# Patient Record
Sex: FEMALE | Race: WHITE
[De-identification: names, ages, dates, MRNs, and addresses within clinical notes are randomized per-mention and may not be internally consistent; named-entity substitution may affect disease eponyms.]

---

## 2017-11-27 ENCOUNTER — HOSPITAL ENCOUNTER (EMERGENCY)
Dept: HOSPITAL 54 - ER | Age: 67
Discharge: HOME | End: 2017-11-27
Payer: COMMERCIAL

## 2017-11-27 VITALS — HEIGHT: 63 IN | WEIGHT: 130 LBS | BODY MASS INDEX: 23.04 KG/M2

## 2017-11-27 VITALS — SYSTOLIC BLOOD PRESSURE: 156 MMHG | DIASTOLIC BLOOD PRESSURE: 82 MMHG

## 2017-11-27 DIAGNOSIS — N39.0: Primary | ICD-10-CM

## 2017-11-27 DIAGNOSIS — E11.9: ICD-10-CM

## 2017-11-27 DIAGNOSIS — Z95.818: ICD-10-CM

## 2017-11-27 DIAGNOSIS — F41.9: ICD-10-CM

## 2017-11-27 DIAGNOSIS — R11.2: ICD-10-CM

## 2017-11-27 DIAGNOSIS — R79.1: ICD-10-CM

## 2017-11-27 LAB
ALBUMIN SERPL BCP-MCNC: 3.5 G/DL (ref 3.4–5)
ALP SERPL-CCNC: 99 U/L (ref 46–116)
ALT SERPL W P-5'-P-CCNC: 15 U/L (ref 12–78)
APPEARANCE UR: (no result)
APTT PPP: 29 SEC (ref 23–34)
AST SERPL W P-5'-P-CCNC: 14 U/L (ref 15–37)
BASOPHILS # BLD AUTO: 0 /CMM (ref 0–0.2)
BASOPHILS NFR BLD AUTO: 0.1 % (ref 0–2)
BILIRUB DIRECT SERPL-MCNC: 0.1 MG/DL (ref 0–0.2)
BILIRUB SERPL-MCNC: 0.4 MG/DL (ref 0.2–1)
BILIRUB UR QL STRIP: NEGATIVE
BUN SERPL-MCNC: 25 MG/DL (ref 7–18)
CALCIUM SERPL-MCNC: 10.3 MG/DL (ref 8.5–10.1)
CHLORIDE SERPL-SCNC: 96 MMOL/L (ref 98–107)
CO2 SERPL-SCNC: 27 MMOL/L (ref 21–32)
COLOR UR: YELLOW
CREAT SERPL-MCNC: 1.1 MG/DL (ref 0.6–1.3)
DEPRECATED SQUAMOUS URNS QL MICRO: (no result) /HPF
EOSINOPHIL # BLD AUTO: 0 /CMM (ref 0–0.7)
EOSINOPHIL NFR BLD AUTO: 0.2 % (ref 0–6)
GLUCOSE SERPL-MCNC: 334 MG/DL (ref 74–106)
GLUCOSE UR STRIP-MCNC: (no result) MG/DL
HCT VFR BLD AUTO: 31 % (ref 33–45)
HGB BLD-MCNC: 10.4 G/DL (ref 11.5–14.8)
HGB UR QL STRIP: (no result) ERY/UL
INR PPP: 1.07 (ref 0.87–1.13)
KETONES UR STRIP-MCNC: (no result) MG/DL
LEUKOCYTE ESTERASE UR QL STRIP: (no result)
LIPASE SERPL-CCNC: 75 U/L (ref 73–393)
LYMPHOCYTES NFR BLD AUTO: 0.5 /CMM (ref 0.8–4.8)
LYMPHOCYTES NFR BLD AUTO: 4.7 % (ref 20–44)
MCH RBC QN AUTO: 30 PG (ref 26–33)
MCHC RBC AUTO-ENTMCNC: 33 G/DL (ref 31–36)
MCV RBC AUTO: 91 FL (ref 82–100)
MONOCYTES NFR BLD AUTO: 0.2 /CMM (ref 0.1–1.3)
MONOCYTES NFR BLD AUTO: 2.3 % (ref 2–12)
NEUTROPHILS # BLD AUTO: 9.6 /CMM (ref 1.8–8.9)
NEUTROPHILS NFR BLD AUTO: 92.7 % (ref 43–81)
NITRITE UR QL STRIP: POSITIVE
PH UR STRIP: 6 [PH] (ref 5–8)
PLATELET # BLD AUTO: 450 /CMM (ref 150–450)
POTASSIUM SERPL-SCNC: 3.3 MMOL/L (ref 3.5–5.1)
PROT SERPL-MCNC: 8.4 G/DL (ref 6.4–8.2)
PROT UR QL STRIP: (no result) MG/DL
PROTHROMBIN TIME: 11.1 SECS (ref 9.5–12.7)
RBC # BLD AUTO: 3.46 MIL/UL (ref 4–5.2)
RBC #/AREA URNS HPF: (no result) /HPF (ref 0–2)
RDW COEFFICIENT OF VARIATION: 13 (ref 11.5–15)
SODIUM SERPL-SCNC: 139 MMOL/L (ref 136–145)
TROPONIN I SERPL-MCNC: < 0.017 NG/ML (ref 0–0.06)
UROBILINOGEN UR STRIP-MCNC: 0.2 EU/DL
WBC #/AREA URNS HPF: (no result) /HPF
WBC #/AREA URNS HPF: (no result) /HPF (ref 0–3)
WBC NRBC COR # BLD AUTO: 10.3 K/UL (ref 4.3–11)

## 2017-11-27 PROCEDURE — 87086 URINE CULTURE/COLONY COUNT: CPT

## 2017-11-27 PROCEDURE — 96374 THER/PROPH/DIAG INJ IV PUSH: CPT

## 2017-11-27 PROCEDURE — Z7610: HCPCS

## 2017-11-27 PROCEDURE — 80076 HEPATIC FUNCTION PANEL: CPT

## 2017-11-27 PROCEDURE — 87186 SC STD MICRODIL/AGAR DIL: CPT

## 2017-11-27 PROCEDURE — 93005 ELECTROCARDIOGRAM TRACING: CPT

## 2017-11-27 PROCEDURE — 80048 BASIC METABOLIC PNL TOTAL CA: CPT

## 2017-11-27 PROCEDURE — 85025 COMPLETE CBC W/AUTO DIFF WBC: CPT

## 2017-11-27 PROCEDURE — 71010: CPT

## 2017-11-27 PROCEDURE — 99285 EMERGENCY DEPT VISIT HI MDM: CPT

## 2017-11-27 PROCEDURE — 96375 TX/PRO/DX INJ NEW DRUG ADDON: CPT

## 2017-11-27 PROCEDURE — 36415 COLL VENOUS BLD VENIPUNCTURE: CPT

## 2017-11-27 PROCEDURE — 84484 ASSAY OF TROPONIN QUANT: CPT

## 2017-11-27 PROCEDURE — 96376 TX/PRO/DX INJ SAME DRUG ADON: CPT

## 2017-11-27 PROCEDURE — A4606 OXYGEN PROBE USED W OXIMETER: HCPCS

## 2017-11-27 PROCEDURE — 96361 HYDRATE IV INFUSION ADD-ON: CPT

## 2017-11-27 PROCEDURE — C9113 INJ PANTOPRAZOLE SODIUM, VIA: HCPCS

## 2017-11-27 PROCEDURE — 83690 ASSAY OF LIPASE: CPT

## 2017-11-27 PROCEDURE — 81001 URINALYSIS AUTO W/SCOPE: CPT

## 2017-11-27 PROCEDURE — 85730 THROMBOPLASTIN TIME PARTIAL: CPT

## 2017-11-27 NOTE — NUR
BIB RA C/O ABD PAIN, N/V, STATING SHE'S HAD A UTI FOR A FEW DAYS NOW.  PATIENT 
IS A/OX 4. BREATHING EVEN AND UNLABORED. NO SOB. VITALS STABLE.  SAFETY AND 
COMFORT MEASURES IN PLACE. AWAITING MD ORDERS .

## 2018-09-16 ENCOUNTER — HOSPITAL ENCOUNTER (INPATIENT)
Dept: HOSPITAL 54 - ER | Age: 68
LOS: 1 days | Discharge: ANOTHER HOSPITAL | DRG: 242 | End: 2018-09-17
Attending: FAMILY MEDICINE | Admitting: FAMILY MEDICINE
Payer: COMMERCIAL

## 2018-09-16 VITALS — DIASTOLIC BLOOD PRESSURE: 61 MMHG | SYSTOLIC BLOOD PRESSURE: 108 MMHG

## 2018-09-16 VITALS — WEIGHT: 160 LBS | HEIGHT: 64 IN | BODY MASS INDEX: 27.31 KG/M2

## 2018-09-16 VITALS — DIASTOLIC BLOOD PRESSURE: 49 MMHG | SYSTOLIC BLOOD PRESSURE: 104 MMHG

## 2018-09-16 VITALS — DIASTOLIC BLOOD PRESSURE: 78 MMHG | SYSTOLIC BLOOD PRESSURE: 104 MMHG

## 2018-09-16 DIAGNOSIS — Z79.899: ICD-10-CM

## 2018-09-16 DIAGNOSIS — E87.1: ICD-10-CM

## 2018-09-16 DIAGNOSIS — N18.4: ICD-10-CM

## 2018-09-16 DIAGNOSIS — F41.9: ICD-10-CM

## 2018-09-16 DIAGNOSIS — I25.5: ICD-10-CM

## 2018-09-16 DIAGNOSIS — D72.829: ICD-10-CM

## 2018-09-16 DIAGNOSIS — Z79.82: ICD-10-CM

## 2018-09-16 DIAGNOSIS — R00.1: ICD-10-CM

## 2018-09-16 DIAGNOSIS — E86.1: ICD-10-CM

## 2018-09-16 DIAGNOSIS — D63.8: ICD-10-CM

## 2018-09-16 DIAGNOSIS — Z79.4: ICD-10-CM

## 2018-09-16 DIAGNOSIS — R57.0: ICD-10-CM

## 2018-09-16 DIAGNOSIS — N17.0: ICD-10-CM

## 2018-09-16 DIAGNOSIS — E44.1: ICD-10-CM

## 2018-09-16 DIAGNOSIS — I25.10: ICD-10-CM

## 2018-09-16 DIAGNOSIS — Z95.5: ICD-10-CM

## 2018-09-16 DIAGNOSIS — J96.00: ICD-10-CM

## 2018-09-16 DIAGNOSIS — I21.4: Primary | ICD-10-CM

## 2018-09-16 DIAGNOSIS — Z79.84: ICD-10-CM

## 2018-09-16 DIAGNOSIS — E11.22: ICD-10-CM

## 2018-09-16 DIAGNOSIS — I25.2: ICD-10-CM

## 2018-09-16 DIAGNOSIS — E03.9: ICD-10-CM

## 2018-09-16 DIAGNOSIS — I44.2: ICD-10-CM

## 2018-09-16 DIAGNOSIS — E78.5: ICD-10-CM

## 2018-09-16 LAB
APTT PPP: 28 SEC (ref 23–34)
BASOPHILS # BLD AUTO: 0.1 /CMM (ref 0–0.2)
BASOPHILS NFR BLD AUTO: 1 % (ref 0–2)
BUN SERPL-MCNC: 34 MG/DL (ref 7–18)
CALCIUM SERPL-MCNC: 8.2 MG/DL (ref 8.5–10.1)
CHLORIDE SERPL-SCNC: 101 MMOL/L (ref 98–107)
CO2 SERPL-SCNC: 22 MMOL/L (ref 21–32)
CREAT SERPL-MCNC: 1.9 MG/DL (ref 0.6–1.3)
EOSINOPHIL NFR BLD AUTO: 1.2 % (ref 0–6)
GLUCOSE SERPL-MCNC: 263 MG/DL (ref 74–106)
HCT VFR BLD AUTO: 28 % (ref 33–45)
HGB BLD-MCNC: 9.3 G/DL (ref 11.5–14.8)
INR PPP: 1.06 (ref 0.85–1.15)
LYMPHOCYTES NFR BLD AUTO: 1.3 /CMM (ref 0.8–4.8)
LYMPHOCYTES NFR BLD AUTO: 9.6 % (ref 20–44)
MCH RBC QN AUTO: 30 PG (ref 26–33)
MCHC RBC AUTO-ENTMCNC: 33 G/DL (ref 31–36)
MCV RBC AUTO: 89 FL (ref 82–100)
MONOCYTES NFR BLD AUTO: 0.4 /CMM (ref 0.1–1.3)
MONOCYTES NFR BLD AUTO: 3.2 % (ref 2–12)
NEUTROPHILS # BLD AUTO: 11.8 /CMM (ref 1.8–8.9)
NEUTROPHILS NFR BLD AUTO: 85 % (ref 43–81)
PLATELET # BLD AUTO: 322 /CMM (ref 150–450)
POTASSIUM SERPL-SCNC: 4.5 MMOL/L (ref 3.5–5.1)
RBC # BLD AUTO: 3.16 MIL/UL (ref 4–5.2)
RDW COEFFICIENT OF VARIATION: 14.5 (ref 11.5–15)
SODIUM SERPL-SCNC: 132 MMOL/L (ref 136–145)
TROPONIN I SERPL-MCNC: 2.82 NG/ML (ref 0–0.06)
WBC NRBC COR # BLD AUTO: 13.8 K/UL (ref 4.3–11)

## 2018-09-16 PROCEDURE — C1751 CATH, INF, PER/CENT/MIDLINE: HCPCS

## 2018-09-16 PROCEDURE — A4606 OXYGEN PROBE USED W OXIMETER: HCPCS

## 2018-09-16 PROCEDURE — Z7610: HCPCS

## 2018-09-16 PROCEDURE — C9113 INJ PANTOPRAZOLE SODIUM, VIA: HCPCS

## 2018-09-16 RX ADMIN — INSULIN GLARGINE SCH UNIT: 100 INJECTION, SOLUTION SUBCUTANEOUS at 21:52

## 2018-09-16 RX ADMIN — DEXTROSE MONOHYDRATE PRN MG: 50 INJECTION, SOLUTION INTRAVENOUS at 22:10

## 2018-09-16 NOTE — NUR
REPORT RECEIVED FROM MOJGAN KING REGARDING PATIENT. BEING ADMITTED WITH CHEST PAIN TO 
TELEMETRY FLOOR.

-------------------------------------------------------------------------------

Addendum: 09/16/18 at 1933 by YULI DEL ROSARIO RN

-------------------------------------------------------------------------------

WRONG TIME FILED. REPORT RECEIVED AT 4146

## 2018-09-16 NOTE — NUR
TELE RN CLOSING NOTE



PATIENT IN BED. ALERT ORIENTED X4. ON ROOM AIR, TOLERATING WELL. IN NO APPARENT DISTRESS OR 
DISCOMFORT AT THIS TIME. RESPIRATIONS EVEN AND UNLABORED. DENIES CHEST PAIN AND SOB. ON TELE 
MONITORING WITH SINUS RHYTHM AND HR OF 81. PATIENT IS STABLE. RIGHT AC 20G IVC SL, PATENT 
AND INTACT. KEPT CLEAN AND COMFORTABLE. ALL NEEDS ATTENDED. SAFETY MEASURES IN PLACE, BED IN 
LOW LOCKED POSITION, SIDE RAILS UP X2, CALL LIGHT WITHIN EASY REACH. WILL ENDORSE TO PM 
NURSE FOR EDDIE.

## 2018-09-16 NOTE — NUR
PATIENT TO ED DT CHEST PAIN, 5/10 RADIATING TO ABELARDO.JAWS.PATIENT IS AWAKE AND 
ALERT. NOT IN DISTRESS. SKIN IS WARM TO TOUCH AND NON DIAPHORETIC. PATIENT IS 
AFEBRILE.VSS

## 2018-09-16 NOTE — NUR
MS RN ADMISSION NOTE



RECEIVED PATIENT FROM THE ER, ON A GURNEY, ACCOMPANIED BY THE RN AND THE FAMILY MEMBER. ABLE 
TO AMBULATE TO BED. ALERT ORIENTED X3. IN NO APPARENT DISTRESS OR DISCOMFORT AT THIS TIME. 
RESPIRATIONS EVEN AND UNLABORED. PATIENT IS STABLE. VITAL SIGNS STABLE. BP IS SLIGHTLY LOW 
104/49 DUE TO GIVEN MEDICATIONS IN THE ER. PATIENT IS ASYMPTOMATIC. ON ROOM AIR SATURATING 
WELL. PATIENT WAS PLACED ON TELE MONITORING WITH SINUS RHYTHM AND HR IN 60S. PHYSICAL 
ASSESSMENT PERFORMED, NO SKIN IMPAIRMENT OBSERVED. HISTORY OBTAINED FROM PATIENT. 
AMBULATORY, CONTINENT, ABLE TO USE BATHROOM INDEPENDENTLY. PATIENT WITH RIGHT AC 20G IVC. 
PATENT AND INTACT. SALINE LOCK. BELONGINGS CHECKED AND VERIFIED. FORM PLACED IN THE CHART. 
ID BAND ON. MD WAS MADE AWARE ABOUT PATIENT'S ARRIVAL. ADMITTING ORDERS IN PLACE. SAFETY 
MEASURES APPLIED. BED IN LOW LOCKED POSITION, SIDE RAILS UP X2, CALL LIGHT WITHIN EASY 
REACH. ORIENTED TO THE ROOM. WILL CARRY OUT ORDERS AND CONTINUE TO MONITOR.

## 2018-09-16 NOTE — NUR
REPORT RECEIVED FROM MOJGAN KING REGARDING PATIENT. BEING ADMITTED WITH CHEST PAIN TO 
TELEMETRY FLOOR BY DR. CHAMPION

## 2018-09-16 NOTE — NUR
TELE/RN NOTES



RECEIVED PT. SITTING UP IN BED EATING DINNER. PT. IS AWAKE, ALERT AND ORIENTED X4. BREATHING 
EVEN AND UNLABORED ON ROOM AIR. NO SOB, RESPIRATORY DISTRESS OR COMPLAINTS OF PAIN NOTED AT 
THIS TIME. PT. HAS NO COMPLAINTS OF CHEST PAIN NOTED AT THIS TIME. PT. WITH EXTERNAL CARDIAC 
MONITOR PRESENT AND INTACT. CURRENT RHYTHM = SINUS RHYTHM WITH FIRST DEGREE AV BLOCK HR 78. 
PT. WITH RIGHT AC 20 GAUGE IV SALINE LOCK PRESENT, PATENT AND INTACT. PT. WITH FAMILY MEMBER 
PRESENT AT BEDSIDE. BED LOCKED AND IN LOWEST POSITION, SIDE RAILS UP X2, CALL LIGHT WITHIN 
REACH, WILL CONTINUE TO MONITOR.

## 2018-09-17 ENCOUNTER — HOSPITAL ENCOUNTER (INPATIENT)
Dept: HOSPITAL 91 - 6WM | Age: 68
LOS: 11 days | Discharge: NURSING, REHAB OR LONG-TERM CARE | DRG: 222 | End: 2018-09-28
Payer: COMMERCIAL

## 2018-09-17 ENCOUNTER — HOSPITAL ENCOUNTER (INPATIENT)
Age: 68
LOS: 11 days | Discharge: NURSING, REHAB OR LONG-TERM CARE | DRG: 222 | End: 2018-09-28

## 2018-09-17 VITALS — SYSTOLIC BLOOD PRESSURE: 144 MMHG | DIASTOLIC BLOOD PRESSURE: 95 MMHG

## 2018-09-17 VITALS — SYSTOLIC BLOOD PRESSURE: 125 MMHG | DIASTOLIC BLOOD PRESSURE: 66 MMHG

## 2018-09-17 VITALS — DIASTOLIC BLOOD PRESSURE: 76 MMHG | SYSTOLIC BLOOD PRESSURE: 162 MMHG

## 2018-09-17 VITALS — SYSTOLIC BLOOD PRESSURE: 141 MMHG | DIASTOLIC BLOOD PRESSURE: 76 MMHG

## 2018-09-17 VITALS — SYSTOLIC BLOOD PRESSURE: 141 MMHG | DIASTOLIC BLOOD PRESSURE: 110 MMHG

## 2018-09-17 VITALS — DIASTOLIC BLOOD PRESSURE: 82 MMHG | SYSTOLIC BLOOD PRESSURE: 157 MMHG

## 2018-09-17 VITALS — DIASTOLIC BLOOD PRESSURE: 61 MMHG | SYSTOLIC BLOOD PRESSURE: 122 MMHG

## 2018-09-17 VITALS — SYSTOLIC BLOOD PRESSURE: 85 MMHG | DIASTOLIC BLOOD PRESSURE: 39 MMHG

## 2018-09-17 VITALS — SYSTOLIC BLOOD PRESSURE: 148 MMHG | DIASTOLIC BLOOD PRESSURE: 60 MMHG

## 2018-09-17 VITALS — DIASTOLIC BLOOD PRESSURE: 73 MMHG | SYSTOLIC BLOOD PRESSURE: 145 MMHG

## 2018-09-17 VITALS — SYSTOLIC BLOOD PRESSURE: 106 MMHG | DIASTOLIC BLOOD PRESSURE: 50 MMHG

## 2018-09-17 VITALS — DIASTOLIC BLOOD PRESSURE: 98 MMHG | SYSTOLIC BLOOD PRESSURE: 125 MMHG

## 2018-09-17 VITALS — SYSTOLIC BLOOD PRESSURE: 114 MMHG | DIASTOLIC BLOOD PRESSURE: 67 MMHG

## 2018-09-17 VITALS — SYSTOLIC BLOOD PRESSURE: 148 MMHG | DIASTOLIC BLOOD PRESSURE: 57 MMHG

## 2018-09-17 VITALS — DIASTOLIC BLOOD PRESSURE: 55 MMHG | SYSTOLIC BLOOD PRESSURE: 135 MMHG

## 2018-09-17 VITALS — DIASTOLIC BLOOD PRESSURE: 65 MMHG | SYSTOLIC BLOOD PRESSURE: 98 MMHG

## 2018-09-17 VITALS — SYSTOLIC BLOOD PRESSURE: 90 MMHG | DIASTOLIC BLOOD PRESSURE: 36 MMHG

## 2018-09-17 VITALS — SYSTOLIC BLOOD PRESSURE: 127 MMHG | DIASTOLIC BLOOD PRESSURE: 66 MMHG

## 2018-09-17 VITALS — SYSTOLIC BLOOD PRESSURE: 125 MMHG | DIASTOLIC BLOOD PRESSURE: 103 MMHG

## 2018-09-17 VITALS — SYSTOLIC BLOOD PRESSURE: 74 MMHG | DIASTOLIC BLOOD PRESSURE: 50 MMHG

## 2018-09-17 VITALS — DIASTOLIC BLOOD PRESSURE: 58 MMHG | SYSTOLIC BLOOD PRESSURE: 164 MMHG

## 2018-09-17 VITALS — DIASTOLIC BLOOD PRESSURE: 53 MMHG | SYSTOLIC BLOOD PRESSURE: 105 MMHG

## 2018-09-17 VITALS — SYSTOLIC BLOOD PRESSURE: 137 MMHG | DIASTOLIC BLOOD PRESSURE: 74 MMHG

## 2018-09-17 VITALS — DIASTOLIC BLOOD PRESSURE: 40 MMHG | SYSTOLIC BLOOD PRESSURE: 71 MMHG

## 2018-09-17 VITALS — SYSTOLIC BLOOD PRESSURE: 141 MMHG | DIASTOLIC BLOOD PRESSURE: 62 MMHG

## 2018-09-17 VITALS — SYSTOLIC BLOOD PRESSURE: 93 MMHG | DIASTOLIC BLOOD PRESSURE: 57 MMHG

## 2018-09-17 VITALS — DIASTOLIC BLOOD PRESSURE: 46 MMHG | SYSTOLIC BLOOD PRESSURE: 89 MMHG

## 2018-09-17 VITALS — DIASTOLIC BLOOD PRESSURE: 66 MMHG | SYSTOLIC BLOOD PRESSURE: 141 MMHG

## 2018-09-17 VITALS — DIASTOLIC BLOOD PRESSURE: 15 MMHG | SYSTOLIC BLOOD PRESSURE: 80 MMHG

## 2018-09-17 VITALS — SYSTOLIC BLOOD PRESSURE: 144 MMHG | DIASTOLIC BLOOD PRESSURE: 39 MMHG

## 2018-09-17 VITALS — DIASTOLIC BLOOD PRESSURE: 77 MMHG | SYSTOLIC BLOOD PRESSURE: 120 MMHG

## 2018-09-17 VITALS — DIASTOLIC BLOOD PRESSURE: 56 MMHG | SYSTOLIC BLOOD PRESSURE: 164 MMHG

## 2018-09-17 VITALS — DIASTOLIC BLOOD PRESSURE: 46 MMHG | SYSTOLIC BLOOD PRESSURE: 126 MMHG

## 2018-09-17 VITALS — DIASTOLIC BLOOD PRESSURE: 58 MMHG | SYSTOLIC BLOOD PRESSURE: 148 MMHG

## 2018-09-17 VITALS — DIASTOLIC BLOOD PRESSURE: 78 MMHG | SYSTOLIC BLOOD PRESSURE: 158 MMHG

## 2018-09-17 VITALS — SYSTOLIC BLOOD PRESSURE: 117 MMHG | DIASTOLIC BLOOD PRESSURE: 62 MMHG

## 2018-09-17 VITALS — DIASTOLIC BLOOD PRESSURE: 83 MMHG | SYSTOLIC BLOOD PRESSURE: 136 MMHG

## 2018-09-17 VITALS — DIASTOLIC BLOOD PRESSURE: 57 MMHG | SYSTOLIC BLOOD PRESSURE: 116 MMHG

## 2018-09-17 VITALS — DIASTOLIC BLOOD PRESSURE: 51 MMHG | SYSTOLIC BLOOD PRESSURE: 76 MMHG

## 2018-09-17 VITALS — SYSTOLIC BLOOD PRESSURE: 100 MMHG | DIASTOLIC BLOOD PRESSURE: 43 MMHG

## 2018-09-17 VITALS — DIASTOLIC BLOOD PRESSURE: 24 MMHG | SYSTOLIC BLOOD PRESSURE: 44 MMHG

## 2018-09-17 VITALS — SYSTOLIC BLOOD PRESSURE: 98 MMHG | DIASTOLIC BLOOD PRESSURE: 66 MMHG

## 2018-09-17 VITALS — DIASTOLIC BLOOD PRESSURE: 69 MMHG | SYSTOLIC BLOOD PRESSURE: 123 MMHG

## 2018-09-17 VITALS — DIASTOLIC BLOOD PRESSURE: 55 MMHG | SYSTOLIC BLOOD PRESSURE: 99 MMHG

## 2018-09-17 VITALS — SYSTOLIC BLOOD PRESSURE: 111 MMHG | DIASTOLIC BLOOD PRESSURE: 78 MMHG

## 2018-09-17 VITALS — DIASTOLIC BLOOD PRESSURE: 69 MMHG | SYSTOLIC BLOOD PRESSURE: 161 MMHG

## 2018-09-17 VITALS — DIASTOLIC BLOOD PRESSURE: 82 MMHG | SYSTOLIC BLOOD PRESSURE: 192 MMHG

## 2018-09-17 VITALS — DIASTOLIC BLOOD PRESSURE: 49 MMHG | SYSTOLIC BLOOD PRESSURE: 125 MMHG

## 2018-09-17 VITALS — DIASTOLIC BLOOD PRESSURE: 46 MMHG | SYSTOLIC BLOOD PRESSURE: 86 MMHG

## 2018-09-17 VITALS — SYSTOLIC BLOOD PRESSURE: 130 MMHG | DIASTOLIC BLOOD PRESSURE: 51 MMHG

## 2018-09-17 VITALS — SYSTOLIC BLOOD PRESSURE: 93 MMHG | DIASTOLIC BLOOD PRESSURE: 55 MMHG

## 2018-09-17 VITALS — SYSTOLIC BLOOD PRESSURE: 107 MMHG | DIASTOLIC BLOOD PRESSURE: 82 MMHG

## 2018-09-17 VITALS — DIASTOLIC BLOOD PRESSURE: 48 MMHG | SYSTOLIC BLOOD PRESSURE: 115 MMHG

## 2018-09-17 VITALS — DIASTOLIC BLOOD PRESSURE: 50 MMHG | SYSTOLIC BLOOD PRESSURE: 84 MMHG

## 2018-09-17 VITALS — DIASTOLIC BLOOD PRESSURE: 63 MMHG | SYSTOLIC BLOOD PRESSURE: 115 MMHG

## 2018-09-17 VITALS — DIASTOLIC BLOOD PRESSURE: 68 MMHG | SYSTOLIC BLOOD PRESSURE: 101 MMHG

## 2018-09-17 VITALS — SYSTOLIC BLOOD PRESSURE: 96 MMHG | DIASTOLIC BLOOD PRESSURE: 36 MMHG

## 2018-09-17 VITALS — DIASTOLIC BLOOD PRESSURE: 63 MMHG | SYSTOLIC BLOOD PRESSURE: 104 MMHG

## 2018-09-17 VITALS — SYSTOLIC BLOOD PRESSURE: 157 MMHG | DIASTOLIC BLOOD PRESSURE: 79 MMHG

## 2018-09-17 VITALS — DIASTOLIC BLOOD PRESSURE: 45 MMHG | SYSTOLIC BLOOD PRESSURE: 120 MMHG

## 2018-09-17 VITALS — SYSTOLIC BLOOD PRESSURE: 59 MMHG | DIASTOLIC BLOOD PRESSURE: 41 MMHG

## 2018-09-17 VITALS — SYSTOLIC BLOOD PRESSURE: 162 MMHG | DIASTOLIC BLOOD PRESSURE: 47 MMHG

## 2018-09-17 VITALS — DIASTOLIC BLOOD PRESSURE: 73 MMHG | SYSTOLIC BLOOD PRESSURE: 156 MMHG

## 2018-09-17 VITALS — SYSTOLIC BLOOD PRESSURE: 125 MMHG | DIASTOLIC BLOOD PRESSURE: 52 MMHG

## 2018-09-17 VITALS — DIASTOLIC BLOOD PRESSURE: 70 MMHG | SYSTOLIC BLOOD PRESSURE: 138 MMHG

## 2018-09-17 VITALS — SYSTOLIC BLOOD PRESSURE: 148 MMHG | DIASTOLIC BLOOD PRESSURE: 61 MMHG

## 2018-09-17 VITALS — SYSTOLIC BLOOD PRESSURE: 102 MMHG | DIASTOLIC BLOOD PRESSURE: 52 MMHG

## 2018-09-17 VITALS — SYSTOLIC BLOOD PRESSURE: 113 MMHG | DIASTOLIC BLOOD PRESSURE: 51 MMHG

## 2018-09-17 VITALS — SYSTOLIC BLOOD PRESSURE: 203 MMHG | DIASTOLIC BLOOD PRESSURE: 100 MMHG

## 2018-09-17 VITALS — DIASTOLIC BLOOD PRESSURE: 92 MMHG | SYSTOLIC BLOOD PRESSURE: 165 MMHG

## 2018-09-17 VITALS — DIASTOLIC BLOOD PRESSURE: 58 MMHG | SYSTOLIC BLOOD PRESSURE: 114 MMHG

## 2018-09-17 VITALS — SYSTOLIC BLOOD PRESSURE: 113 MMHG | DIASTOLIC BLOOD PRESSURE: 67 MMHG

## 2018-09-17 VITALS — SYSTOLIC BLOOD PRESSURE: 162 MMHG | DIASTOLIC BLOOD PRESSURE: 80 MMHG

## 2018-09-17 VITALS — DIASTOLIC BLOOD PRESSURE: 55 MMHG | SYSTOLIC BLOOD PRESSURE: 154 MMHG

## 2018-09-17 VITALS — SYSTOLIC BLOOD PRESSURE: 109 MMHG | DIASTOLIC BLOOD PRESSURE: 58 MMHG

## 2018-09-17 VITALS — SYSTOLIC BLOOD PRESSURE: 81 MMHG | DIASTOLIC BLOOD PRESSURE: 53 MMHG

## 2018-09-17 VITALS — SYSTOLIC BLOOD PRESSURE: 141 MMHG | DIASTOLIC BLOOD PRESSURE: 93 MMHG

## 2018-09-17 VITALS — SYSTOLIC BLOOD PRESSURE: 160 MMHG | DIASTOLIC BLOOD PRESSURE: 122 MMHG

## 2018-09-17 VITALS — SYSTOLIC BLOOD PRESSURE: 109 MMHG | DIASTOLIC BLOOD PRESSURE: 54 MMHG

## 2018-09-17 VITALS — DIASTOLIC BLOOD PRESSURE: 76 MMHG | SYSTOLIC BLOOD PRESSURE: 142 MMHG

## 2018-09-17 VITALS — SYSTOLIC BLOOD PRESSURE: 110 MMHG | DIASTOLIC BLOOD PRESSURE: 63 MMHG

## 2018-09-17 VITALS — DIASTOLIC BLOOD PRESSURE: 77 MMHG | SYSTOLIC BLOOD PRESSURE: 126 MMHG

## 2018-09-17 VITALS — SYSTOLIC BLOOD PRESSURE: 107 MMHG | DIASTOLIC BLOOD PRESSURE: 75 MMHG

## 2018-09-17 VITALS — DIASTOLIC BLOOD PRESSURE: 72 MMHG | SYSTOLIC BLOOD PRESSURE: 132 MMHG

## 2018-09-17 VITALS — SYSTOLIC BLOOD PRESSURE: 119 MMHG | DIASTOLIC BLOOD PRESSURE: 81 MMHG

## 2018-09-17 VITALS — DIASTOLIC BLOOD PRESSURE: 57 MMHG | SYSTOLIC BLOOD PRESSURE: 106 MMHG

## 2018-09-17 VITALS — DIASTOLIC BLOOD PRESSURE: 74 MMHG | SYSTOLIC BLOOD PRESSURE: 142 MMHG

## 2018-09-17 VITALS — DIASTOLIC BLOOD PRESSURE: 98 MMHG | SYSTOLIC BLOOD PRESSURE: 148 MMHG

## 2018-09-17 VITALS — SYSTOLIC BLOOD PRESSURE: 116 MMHG | DIASTOLIC BLOOD PRESSURE: 58 MMHG

## 2018-09-17 VITALS — SYSTOLIC BLOOD PRESSURE: 130 MMHG | DIASTOLIC BLOOD PRESSURE: 72 MMHG

## 2018-09-17 VITALS — DIASTOLIC BLOOD PRESSURE: 52 MMHG | SYSTOLIC BLOOD PRESSURE: 91 MMHG

## 2018-09-17 VITALS — SYSTOLIC BLOOD PRESSURE: 157 MMHG | DIASTOLIC BLOOD PRESSURE: 71 MMHG

## 2018-09-17 VITALS — DIASTOLIC BLOOD PRESSURE: 74 MMHG | SYSTOLIC BLOOD PRESSURE: 149 MMHG

## 2018-09-17 VITALS — SYSTOLIC BLOOD PRESSURE: 93 MMHG | DIASTOLIC BLOOD PRESSURE: 39 MMHG

## 2018-09-17 VITALS — DIASTOLIC BLOOD PRESSURE: 68 MMHG | SYSTOLIC BLOOD PRESSURE: 115 MMHG

## 2018-09-17 VITALS — SYSTOLIC BLOOD PRESSURE: 157 MMHG | DIASTOLIC BLOOD PRESSURE: 75 MMHG

## 2018-09-17 VITALS — DIASTOLIC BLOOD PRESSURE: 42 MMHG | SYSTOLIC BLOOD PRESSURE: 83 MMHG

## 2018-09-17 VITALS — SYSTOLIC BLOOD PRESSURE: 114 MMHG | DIASTOLIC BLOOD PRESSURE: 69 MMHG

## 2018-09-17 VITALS — DIASTOLIC BLOOD PRESSURE: 33 MMHG | SYSTOLIC BLOOD PRESSURE: 97 MMHG

## 2018-09-17 VITALS — DIASTOLIC BLOOD PRESSURE: 62 MMHG | SYSTOLIC BLOOD PRESSURE: 102 MMHG

## 2018-09-17 VITALS — SYSTOLIC BLOOD PRESSURE: 93 MMHG | DIASTOLIC BLOOD PRESSURE: 68 MMHG

## 2018-09-17 VITALS — DIASTOLIC BLOOD PRESSURE: 54 MMHG | SYSTOLIC BLOOD PRESSURE: 81 MMHG

## 2018-09-17 VITALS — SYSTOLIC BLOOD PRESSURE: 168 MMHG | DIASTOLIC BLOOD PRESSURE: 81 MMHG

## 2018-09-17 VITALS — SYSTOLIC BLOOD PRESSURE: 45 MMHG | DIASTOLIC BLOOD PRESSURE: 24 MMHG

## 2018-09-17 VITALS — SYSTOLIC BLOOD PRESSURE: 136 MMHG | DIASTOLIC BLOOD PRESSURE: 71 MMHG

## 2018-09-17 VITALS — SYSTOLIC BLOOD PRESSURE: 106 MMHG | DIASTOLIC BLOOD PRESSURE: 79 MMHG

## 2018-09-17 VITALS — SYSTOLIC BLOOD PRESSURE: 131 MMHG | DIASTOLIC BLOOD PRESSURE: 61 MMHG

## 2018-09-17 VITALS — SYSTOLIC BLOOD PRESSURE: 111 MMHG | DIASTOLIC BLOOD PRESSURE: 55 MMHG

## 2018-09-17 VITALS — DIASTOLIC BLOOD PRESSURE: 69 MMHG | SYSTOLIC BLOOD PRESSURE: 94 MMHG

## 2018-09-17 VITALS — DIASTOLIC BLOOD PRESSURE: 43 MMHG | SYSTOLIC BLOOD PRESSURE: 78 MMHG

## 2018-09-17 VITALS — SYSTOLIC BLOOD PRESSURE: 82 MMHG | DIASTOLIC BLOOD PRESSURE: 61 MMHG

## 2018-09-17 VITALS — SYSTOLIC BLOOD PRESSURE: 155 MMHG | DIASTOLIC BLOOD PRESSURE: 65 MMHG

## 2018-09-17 VITALS — SYSTOLIC BLOOD PRESSURE: 53 MMHG | DIASTOLIC BLOOD PRESSURE: 36 MMHG

## 2018-09-17 VITALS — DIASTOLIC BLOOD PRESSURE: 62 MMHG | SYSTOLIC BLOOD PRESSURE: 93 MMHG

## 2018-09-17 DIAGNOSIS — E78.5: ICD-10-CM

## 2018-09-17 DIAGNOSIS — I44.2: ICD-10-CM

## 2018-09-17 DIAGNOSIS — E83.42: ICD-10-CM

## 2018-09-17 DIAGNOSIS — E03.9: ICD-10-CM

## 2018-09-17 DIAGNOSIS — R53.81: ICD-10-CM

## 2018-09-17 DIAGNOSIS — N18.9: ICD-10-CM

## 2018-09-17 DIAGNOSIS — R33.9: ICD-10-CM

## 2018-09-17 DIAGNOSIS — E11.42: ICD-10-CM

## 2018-09-17 DIAGNOSIS — E11.65: ICD-10-CM

## 2018-09-17 DIAGNOSIS — E83.9: ICD-10-CM

## 2018-09-17 DIAGNOSIS — Z87.891: ICD-10-CM

## 2018-09-17 DIAGNOSIS — R57.0: ICD-10-CM

## 2018-09-17 DIAGNOSIS — I25.10: ICD-10-CM

## 2018-09-17 DIAGNOSIS — Z79.82: ICD-10-CM

## 2018-09-17 DIAGNOSIS — D72.829: ICD-10-CM

## 2018-09-17 DIAGNOSIS — I25.5: ICD-10-CM

## 2018-09-17 DIAGNOSIS — Z79.4: ICD-10-CM

## 2018-09-17 DIAGNOSIS — I25.2: ICD-10-CM

## 2018-09-17 DIAGNOSIS — D64.9: ICD-10-CM

## 2018-09-17 DIAGNOSIS — Z95.5: ICD-10-CM

## 2018-09-17 DIAGNOSIS — I21.4: Primary | ICD-10-CM

## 2018-09-17 DIAGNOSIS — J96.00: ICD-10-CM

## 2018-09-17 DIAGNOSIS — N17.0: ICD-10-CM

## 2018-09-17 DIAGNOSIS — I13.0: ICD-10-CM

## 2018-09-17 DIAGNOSIS — I50.23: ICD-10-CM

## 2018-09-17 LAB
ADD MAN DIFF?: NO
ALBUMIN SERPL BCP-MCNC: 3 G/DL (ref 3.4–5)
ALP SERPL-CCNC: 84 U/L (ref 46–116)
ALT SERPL W P-5'-P-CCNC: 29 U/L (ref 12–78)
APTT PPP: 33 SEC (ref 23–34)
AST SERPL W P-5'-P-CCNC: 44 U/L (ref 15–37)
BASOPHIL #: 0 10^3/UL (ref 0–0.1)
BASOPHILS # BLD AUTO: 0.1 /CMM (ref 0–0.2)
BASOPHILS %: 0.1 % (ref 0–2)
BASOPHILS NFR BLD AUTO: 1.2 % (ref 0–2)
BASOPHILS NFR BLD MANUAL: 0 % (ref 0–2)
BILIRUB DIRECT SERPL-MCNC: 0.1 MG/DL (ref 0–0.2)
BILIRUB SERPL-MCNC: 0.4 MG/DL (ref 0.2–1)
BUN SERPL-MCNC: 36 MG/DL (ref 7–18)
CALCIUM SERPL-MCNC: 7.8 MG/DL (ref 8.5–10.1)
CHLORIDE SERPL-SCNC: 105 MMOL/L (ref 98–107)
CHOLEST SERPL-MCNC: 85 MG/DL (ref ?–200)
CO2 SERPL-SCNC: 19 MMOL/L (ref 21–32)
CREAT SERPL-MCNC: 1.7 MG/DL (ref 0.6–1.3)
EOSINOPHIL NFR BLD AUTO: 1 % (ref 0–6)
EOSINOPHIL NFR BLD MANUAL: 1 % (ref 0–4)
EOSINOPHILS #: 0 10^3/UL (ref 0–0.5)
EOSINOPHILS %: 0 % (ref 0–7)
FERRITIN SERPL-MCNC: 59 NG/ML (ref 8–388)
GLUCOSE SERPL-MCNC: 242 MG/DL (ref 74–106)
HCT VFR BLD AUTO: 25 % (ref 33–45)
HDLC SERPL-MCNC: 28 MG/DL (ref 40–60)
HEMATOCRIT: 26.6 % (ref 37–47)
HEMOGLOBIN: 8.3 G/DL (ref 12–16)
HGB BLD-MCNC: 8.3 G/DL (ref 11.5–14.8)
IMMATURE GRANS #M: 0.06 10^3/UL (ref 0–0.03)
IMMATURE GRANS % (M): 0.5 % (ref 0–0.43)
INR PPP: 1.05 (ref 0.87–1.13)
INR: 1.21
IRON SERPL-MCNC: 13 UG/DL (ref 50–175)
LDLC SERPL DIRECT ASSAY-MCNC: 44 MG/DL (ref 0–99)
LYMPHOCYTES #: 1.4 10^3/UL (ref 0.8–2.9)
LYMPHOCYTES %: 11.6 % (ref 15–51)
LYMPHOCYTES NFR BLD AUTO: 1.4 /CMM (ref 0.8–4.8)
LYMPHOCYTES NFR BLD AUTO: 11.9 % (ref 20–44)
LYMPHOCYTES NFR BLD MANUAL: 18 % (ref 16–48)
MAGNESIUM SERPL-MCNC: 1.1 MG/DL (ref 1.8–2.4)
MCH RBC QN AUTO: 29 PG (ref 26–33)
MCHC RBC AUTO-ENTMCNC: 33 G/DL (ref 31–36)
MCV RBC AUTO: 88 FL (ref 82–100)
MEAN CORPUSCULAR HEMOGLOBIN: 28.9 PG (ref 29–33)
MEAN CORPUSCULAR HGB CONC: 31.2 G/DL (ref 32–37)
MEAN CORPUSCULAR VOLUME: 92.7 FL (ref 82–101)
MEAN PLATELET VOLUME: 9.9 FL (ref 7.4–10.4)
MONOCYTE #: 0.7 10^3/UL (ref 0.3–0.9)
MONOCYTES %: 6 % (ref 0–11)
MONOCYTES NFR BLD AUTO: 0.5 /CMM (ref 0.1–1.3)
MONOCYTES NFR BLD AUTO: 4.2 % (ref 2–12)
MONOCYTES NFR BLD MANUAL: 3 % (ref 0–11)
NEUTROPHIL #: 9.6 10^3/UL (ref 1.6–7.5)
NEUTROPHILS # BLD AUTO: 10 /CMM (ref 1.8–8.9)
NEUTROPHILS %: 81.8 % (ref 39–77)
NEUTROPHILS NFR BLD AUTO: 81.7 % (ref 43–81)
NEUTS BAND NFR BLD MANUAL: 6 % (ref 0–5)
NEUTS SEG NFR BLD MANUAL: 72 % (ref 42–76)
NUCLEATED RED BLOOD CELLS #: 0 10^3/UL (ref 0–0)
NUCLEATED RED BLOOD CELLS%: 0 /100WBC (ref 0–0)
PARTIAL THROMBOPLASTIN TIME: 39.5 SEC (ref 25–35)
PARTIAL THROMBOPLASTIN TIME: 39.9 SEC (ref 25–35)
PHOSPHATE SERPL-MCNC: 3.9 MG/DL (ref 2.5–4.9)
PLATELET # BLD AUTO: 322 /CMM (ref 150–450)
PLATELET COUNT: 367 10^3/UL (ref 140–415)
POTASSIUM SERPL-SCNC: 4.9 MMOL/L (ref 3.5–5.1)
PROT SERPL-MCNC: 6.6 G/DL (ref 6.4–8.2)
PROTIME: 15.5 SEC (ref 11.9–14.9)
PT RATIO: 1.2
RBC # BLD AUTO: 2.88 MIL/UL (ref 4–5.2)
RDW COEFFICIENT OF VARIATION: 14.7 (ref 11.5–15)
RED BLOOD COUNT: 2.87 10^6/UL (ref 4.2–5.4)
RED CELL DISTRIBUTION WIDTH: 15 % (ref 11.5–14.5)
SODIUM SERPL-SCNC: 138 MMOL/L (ref 136–145)
TIBC SERPL-MCNC: 256 UG/DL (ref 250–450)
TRIGL SERPL-MCNC: 132 MG/DL (ref 30–150)
TSH SERPL DL<=0.005 MIU/L-ACNC: 1.83 UIU/ML (ref 0.36–3.74)
WBC NRBC COR # BLD AUTO: 12.1 K/UL (ref 4.3–11)
WHITE BLOOD COUNT: 11.8 10^3/UL (ref 4.8–10.8)

## 2018-09-17 PROCEDURE — 82962 GLUCOSE BLOOD TEST: CPT

## 2018-09-17 PROCEDURE — 02HK3JZ INSERTION OF PACEMAKER LEAD INTO RIGHT VENTRICLE, PERCUTANEOUS APPROACH: ICD-10-PCS | Performed by: INTERNAL MEDICINE

## 2018-09-17 PROCEDURE — 86920 COMPATIBILITY TEST SPIN: CPT

## 2018-09-17 PROCEDURE — 02H63KZ INSERTION OF DEFIBRILLATOR LEAD INTO RIGHT ATRIUM, PERCUTANEOUS APPROACH: ICD-10-PCS

## 2018-09-17 PROCEDURE — 02HK3KZ INSERTION OF DEFIBRILLATOR LEAD INTO RIGHT VENTRICLE, PERCUTANEOUS APPROACH: ICD-10-PCS

## 2018-09-17 PROCEDURE — 97110 THERAPEUTIC EXERCISES: CPT

## 2018-09-17 PROCEDURE — 94770: CPT

## 2018-09-17 PROCEDURE — 87081 CULTURE SCREEN ONLY: CPT

## 2018-09-17 PROCEDURE — 83735 ASSAY OF MAGNESIUM: CPT

## 2018-09-17 PROCEDURE — B543ZZA ULTRASONOGRAPHY OF RIGHT JUGULAR VEINS, GUIDANCE: ICD-10-PCS | Performed by: INTERNAL MEDICINE

## 2018-09-17 PROCEDURE — 36592 COLLECT BLOOD FROM PICC: CPT

## 2018-09-17 PROCEDURE — 02100Z9 BYPASS CORONARY ARTERY, ONE ARTERY FROM LEFT INTERNAL MAMMARY, OPEN APPROACH: ICD-10-PCS

## 2018-09-17 PROCEDURE — 84155 ASSAY OF PROTEIN SERUM: CPT

## 2018-09-17 PROCEDURE — 93880 EXTRACRANIAL BILAT STUDY: CPT

## 2018-09-17 PROCEDURE — 85730 THROMBOPLASTIN TIME PARTIAL: CPT

## 2018-09-17 PROCEDURE — 80053 COMPREHEN METABOLIC PANEL: CPT

## 2018-09-17 PROCEDURE — 36430 TRANSFUSION BLD/BLD COMPNT: CPT

## 2018-09-17 PROCEDURE — 86901 BLOOD TYPING SEROLOGIC RH(D): CPT

## 2018-09-17 PROCEDURE — 5A1221Z PERFORMANCE OF CARDIAC OUTPUT, CONTINUOUS: ICD-10-PCS

## 2018-09-17 PROCEDURE — 85610 PROTHROMBIN TIME: CPT

## 2018-09-17 PROCEDURE — 81001 URINALYSIS AUTO W/SCOPE: CPT

## 2018-09-17 PROCEDURE — 94002 VENT MGMT INPAT INIT DAY: CPT

## 2018-09-17 PROCEDURE — 0JH606Z INSERTION OF PACEMAKER, DUAL CHAMBER INTO CHEST SUBCUTANEOUS TISSUE AND FASCIA, OPEN APPROACH: ICD-10-PCS | Performed by: INTERNAL MEDICINE

## 2018-09-17 PROCEDURE — 5A1223Z PERFORMANCE OF CARDIAC PACING, CONTINUOUS: ICD-10-PCS

## 2018-09-17 PROCEDURE — 85014 HEMATOCRIT: CPT

## 2018-09-17 PROCEDURE — 71046 X-RAY EXAM CHEST 2 VIEWS: CPT

## 2018-09-17 PROCEDURE — 97163 PT EVAL HIGH COMPLEX 45 MIN: CPT

## 2018-09-17 PROCEDURE — 86850 RBC ANTIBODY SCREEN: CPT

## 2018-09-17 PROCEDURE — 85025 COMPLETE CBC W/AUTO DIFF WBC: CPT

## 2018-09-17 PROCEDURE — 97116 GAIT TRAINING THERAPY: CPT

## 2018-09-17 PROCEDURE — 81003 URINALYSIS AUTO W/O SCOPE: CPT

## 2018-09-17 PROCEDURE — 94664 DEMO&/EVAL PT USE INHALER: CPT

## 2018-09-17 PROCEDURE — 0JH608Z INSERTION OF DEFIBRILLATOR GENERATOR INTO CHEST SUBCUTANEOUS TISSUE AND FASCIA, OPEN APPROACH: ICD-10-PCS

## 2018-09-17 PROCEDURE — 84100 ASSAY OF PHOSPHORUS: CPT

## 2018-09-17 PROCEDURE — 021109W BYPASS CORONARY ARTERY, TWO ARTERIES FROM AORTA WITH AUTOLOGOUS VENOUS TISSUE, OPEN APPROACH: ICD-10-PCS

## 2018-09-17 PROCEDURE — 36600 WITHDRAWAL OF ARTERIAL BLOOD: CPT

## 2018-09-17 PROCEDURE — B211YZZ FLUOROSCOPY OF MULTIPLE CORONARY ARTERIES USING OTHER CONTRAST: ICD-10-PCS

## 2018-09-17 PROCEDURE — 93458 L HRT ARTERY/VENTRICLE ANGIO: CPT

## 2018-09-17 PROCEDURE — 82803 BLOOD GASES ANY COMBINATION: CPT

## 2018-09-17 PROCEDURE — 93306 TTE W/DOPPLER COMPLETE: CPT

## 2018-09-17 PROCEDURE — 93325 DOPPLER ECHO COLOR FLOW MAPG: CPT

## 2018-09-17 PROCEDURE — 86900 BLOOD TYPING SEROLOGIC ABO: CPT

## 2018-09-17 PROCEDURE — 76775 US EXAM ABDO BACK WALL LIM: CPT

## 2018-09-17 PROCEDURE — 71045 X-RAY EXAM CHEST 1 VIEW: CPT

## 2018-09-17 PROCEDURE — 80048 BASIC METABOLIC PNL TOTAL CA: CPT

## 2018-09-17 PROCEDURE — 94640 AIRWAY INHALATION TREATMENT: CPT

## 2018-09-17 PROCEDURE — 93312 ECHO TRANSESOPHAGEAL: CPT

## 2018-09-17 PROCEDURE — 84300 ASSAY OF URINE SODIUM: CPT

## 2018-09-17 PROCEDURE — 93320 DOPPLER ECHO COMPLETE: CPT

## 2018-09-17 PROCEDURE — 82043 UR ALBUMIN QUANTITATIVE: CPT

## 2018-09-17 PROCEDURE — 06BP4ZZ EXCISION OF RIGHT SAPHENOUS VEIN, PERCUTANEOUS ENDOSCOPIC APPROACH: ICD-10-PCS

## 2018-09-17 PROCEDURE — 05HM33Z INSERTION OF INFUSION DEVICE INTO RIGHT INTERNAL JUGULAR VEIN, PERCUTANEOUS APPROACH: ICD-10-PCS | Performed by: INTERNAL MEDICINE

## 2018-09-17 PROCEDURE — 4A023N7 MEASUREMENT OF CARDIAC SAMPLING AND PRESSURE, LEFT HEART, PERCUTANEOUS APPROACH: ICD-10-PCS

## 2018-09-17 PROCEDURE — 94003 VENT MGMT INPAT SUBQ DAY: CPT

## 2018-09-17 PROCEDURE — 97530 THERAPEUTIC ACTIVITIES: CPT

## 2018-09-17 PROCEDURE — 93005 ELECTROCARDIOGRAM TRACING: CPT

## 2018-09-17 RX ADMIN — MAGNESIUM SULFATE IN DEXTROSE SCH MLS/HR: 10 INJECTION, SOLUTION INTRAVENOUS at 11:52

## 2018-09-17 RX ADMIN — HEPARIN SODIUM PRN MLS/HR: 5000 INJECTION, SOLUTION INTRAVENOUS at 10:43

## 2018-09-17 RX ADMIN — MAGNESIUM SULFATE IN DEXTROSE SCH MLS/HR: 10 INJECTION, SOLUTION INTRAVENOUS at 10:30

## 2018-09-17 RX ADMIN — HEPARIN SODIUM PRN MLS/HR: 5000 INJECTION, SOLUTION INTRAVENOUS at 10:20

## 2018-09-17 RX ADMIN — ONDANSETRON HYDROCHLORIDE 1 MG: 2 INJECTION, SOLUTION INTRAMUSCULAR; INTRAVENOUS at 20:32

## 2018-09-17 RX ADMIN — PANTOPRAZOLE SODIUM 1 MG: 40 TABLET, DELAYED RELEASE ORAL at 20:32

## 2018-09-17 RX ADMIN — ATORVASTATIN CALCIUM 1 MG: 40 TABLET, FILM COATED ORAL at 20:32

## 2018-09-17 RX ADMIN — HEPARIN SODIUM AND DEXTROSE 1 MLS/HR: 10000; 5 INJECTION INTRAVENOUS at 23:06

## 2018-09-17 RX ADMIN — DEXTROSE MONOHYDRATE PRN MG: 50 INJECTION, SOLUTION INTRAVENOUS at 09:03

## 2018-09-17 RX ADMIN — MORPHINE SULFATE 1 MG: 2 INJECTION, SOLUTION INTRAMUSCULAR; INTRAVENOUS at 20:44

## 2018-09-17 RX ADMIN — INSULIN GLARGINE SCH UNIT: 100 INJECTION, SOLUTION SUBCUTANEOUS at 09:00

## 2018-09-17 RX ADMIN — CEFAZOLIN SODIUM 1 MLS/HR: 2 SOLUTION INTRAVENOUS at 19:30

## 2018-09-17 RX ADMIN — VASOPRESSIN 1 MLS/HR: 20 INJECTION, SOLUTION INTRAMUSCULAR; SUBCUTANEOUS at 22:04

## 2018-09-17 RX ADMIN — INSULIN ASPART 1 UNIT: 100 INJECTION, SOLUTION INTRAVENOUS; SUBCUTANEOUS at 18:24

## 2018-09-17 NOTE — NUR
DISCHARGE NOTE: VS HR 73 RR 18, SAT 96%, /67, PT DENIES SOB, DENIED CHEST PAIN. 
ACCORDING TO [facility] TRANSPORT TEAM THEY PREFER TO REINITIATE  PT ON LEVOPHED FOR TRANSPORT 
TIME.  TRANSPORT RN WAS ASSISTED WITH IV PUMPS: LEVOPHED AT 2 DONTE AND HEPARIN AT 1080 U/HR.  
TRANSVENOUS PACE [facility]ED TO [facility]. [facility] RETURN THE CABLE, THE 
PACER, AND IV PUMPS BACK TO ICU SOH.

## 2018-09-17 NOTE — NUR
AMBULANCE HERE TO  P. FAMILY STATED PT DOES NOT FEEL WELL. "SHE HAD NO FOOD FOR A 
LONG TIME AND HAS A HEADACHE".  ACCU CHECK OBTAINED BS.399 DR GARRIDO NOTIFIED OK TO GIVE 
6 UNIT REG INSULIN, OK TO GIVE MORPHINE. 

INSULIN.  PT MEDICATED AS ORDERED.  REPORT TO TRANSPORT TEAM.  FAMILY AT BEDSIDE, BELONGINGS 
WITH FAMILY.

## 2018-09-17 NOTE — NUR
TRANSVENOUS PASER INSERTED BY DR GARRIDO, RATE SET AT 70, 5 (mA) CAPTURING  CORRECTLY, 
PACING ON DEMAND.  NOTED.  AT BEDSIDE, NO INTERVENTION NEEDED FOR SVT. OK OT 
TITRATE ALL DROPS OFF.

## 2018-09-17 NOTE — NUR
TELE/RN NOTES



CALLED AND NOTIFIED EPIC ON CALL NITHYA SCHULZ PT. STATES SHE FEELS TERRIBLE AND LOOKS IN 
DISTRESS AFTER AMBULATING TO THE BATHROOM. PT. IS SINUS BRADYCARDIA HR 43. PT. INITIAL VITAL 
SIGNS 84/49 HR 43. CURRENT BP 87/50 HR 43. PT. O2 SAT 98% ON 6LPM O2 VIA NC. PT. DENIES ANY 
CHEST PAIN. PT. STATES SHE FEELS LIGHTHEADED AND DIZZY. STAT EKG PERFORMED AND RESULTED 
WITH: JUNCTIONAL RHYTHM, RIGHT BUNDLE BRANCH BLOCK. ANTEROSEPTAL INFARCT AGE UNDETERMINED, 
MARKED ST ABNORMALITY, POSSIBLE LATERAL SUBENDOCARDIAL INJURY. PER NITHYA SCHULZ NEW ORDERS: 
"500ML NS IV BOLUS NOW AND TRANSFER TO ICU NOW. STRICT BEDREST, NOT GETTING OUT OF BED EVEN 
TO COMMODE" WILL CARRY OUT ORDERS. WILL CONTINUE TO MONITOR.

## 2018-09-17 NOTE — NUR
TELE/RN NOTES



BEGAN IV BOLUS OF 500ML NS AS ORDERED AND TRANSFERRED PT. TO ICU VIA GURNEY FOLLOWING ACLS 
PROTOCOL. GAVE BEDSIDE REPORT TO FERNANDO BARR. ENDORSED PT. TO FERNANDO BARR FOR CONTINUITY OF CARE.

## 2018-09-17 NOTE — NUR
ICU RN

RCD PT FROM 3W @ 0540 WITH BRADYCARDIA WITHIN 5 MINS PT WAS NSR; NS BOLUS INFUSING.

PT A/O x4 DENIES CHEST PAIN AND DENIES RESP DIST. PT ON O2 6L NC.

PT EDUCATED SHE IS ON STRICT BED REST; VERBALIZES UNDERSTANDING.

CARDIOLOGY CONSULT W/DR WILBURN PENDING. 

CONTINUE TO MONITOR.

## 2018-09-17 NOTE — NUR
TELE/RN NOTES



NOTIFIED EPIC ON CALL NITHYA SCHULZ PT. 3RD TROPONIN RESULTED AT 4.397 PREVIOUS TROPONIN WAS 
2.686. PT. VITAL SIGNS STABLE. NO COMPLAINTS OF CHEST PAIN. PT. IS NORMAL SINUS RHYTHM ON 
THE TELE MONITOR HR 89. PER NITYHA SCHULZ NEW ORDERS: STAT EKG. RELAYED STAT EKG RESULTS TO NITHYA SCHULZ. PER NITHYA SCHULZ SHE WILL CONTACT DR. WILBURN "SHE WILL LIKELY NEED A CATH AGAIN". NO 
NEW ORDERS AT THIS TIME. WILL CONTINUE TO MONITOR.

## 2018-09-17 NOTE — NUR
TWO IV PUMPS AND PACER RETURNED TO ICU, NO CABLE NOTED.  [facility] ICU AND CATH LAB 
CONTACTED REGARDING RETURNING THE CABLE. AT THIS TIME CABLE IS TILL IN USE, DUE TO PT WILL 
GO TO [facility] TOMORROW IN AM. ELENA WILL GET BACK TO ICU S[facility] WHEN CABLE IS AVAILABLE.

## 2018-09-17 NOTE — NUR
AT 0730AM SB 49, BP 81/53 NOTED BY MONITOR TECH.  PT NOTED TO BE SYMPTOMATIC: PALE, 
DIANOETIC, CONFIRMING DIFFICULTY BREATHING AND CHEST PAIN 7/10 RADIATING TO HER ARM. SHE 
ALSO STATED THAT SHE IS DIZZY AND NAUSEOUS. "I'M NOT FEELING WELL AT ALL, I'M GOING TO DIE." 
PT ALSO STATED "IT FEELS LIKE MY PREVIOUS HEART ATTACK. I'M SCARED".  PT WAS IMITATED ON O2 
VIA NON-REBREATHER, CONNECTED TO EXTERNAL PASER ON CRASH CART WITH HR CORRELATING WITH 
BEDSIDE MONITOR IN 70-80%. EMOTIONAL SUPPORT PROVIDED. EKG OBTAINED WHILE DR LOWE WAS 
UPDATED VIA THE PHONE ON PT'S CONDITION BY CHARGE NURSE JAE.  NEW ORDERS FOR DOPAMINE 
GTT OBTAINED. HOWEVER AFTER INITIATION OF DOPAMINE RR REMAINED LOW AND DR LOWE ORDERED 
ATROPIN STAT. ATROPIN STAT OVERWRITE WAS PERFORMED DUE TO NATURE OF THE EMERGENCY.  HR UP TO 
85 AFTER ATROPIN. LEVOPHED GTT ALSO ORDERED.

## 2018-09-18 LAB
ADD MAN DIFF?: NO
ADD UMIC: YES
ALANINE AMINOTRANSFERASE: 338 IU/L (ref 13–69)
ALBUMIN/GLOBULIN RATIO: 1.28
ALBUMIN: 3.2 G/DL (ref 3.3–4.9)
ALKALINE PHOSPHATASE: 118 IU/L (ref 42–121)
ANION GAP: 14 (ref 8–16)
ANION GAP: 16 (ref 8–16)
ARTERIAL BASE EXCESS: -3.3 MMOL/L (ref -3–3)
ARTERIAL BLOOD GAS OXYGEN SAT: 95.2 MMHG (ref 95–98)
ARTERIAL COHB: 0.2 % (ref 0–3)
ARTERIAL FRACTION OF OXYHGB: 94.9 % (ref 93–99)
ARTERIAL HCO3: 21 MMOL/L (ref 22–26)
ARTERIAL METHB: 0.1 % (ref 0–1.5)
ARTERIAL PCO2: 35.3 MMHG (ref 35–45)
ARTERIAL TOTAL HEMGLOBIN: 11.3 G/DL (ref 12–18)
ASPARTATE AMINO TRANSFERASE: 470 IU/L (ref 15–46)
BASOPHIL #: 0 10^3/UL (ref 0–0.1)
BASOPHILS %: 0.1 % (ref 0–2)
BILIRUBIN,DIRECT: 0 MG/DL (ref 0–0.2)
BILIRUBIN,TOTAL: 0.1 MG/DL (ref 0.2–1.3)
BLOOD GAS LOW PEEP SETTING: 5 CMH2O
BLOOD GAS LOW PEEP SETTING: 5 CMH2O
BLOOD GAS PIP: 24
BLOOD GAS PIP: 24
BLOOD GAS TIDAL VOLUME: 550 ML
BLOOD GAS TIDAL VOLUME: 550 ML
BLOOD UREA NITROGEN: 34 MG/DL (ref 7–20)
BLOOD UREA NITROGEN: 42 MG/DL (ref 7–20)
CALCIUM: 7.9 MG/DL (ref 8.4–10.2)
CALCIUM: 8.1 MG/DL (ref 8.4–10.2)
CARBON DIOXIDE: 17 MMOL/L (ref 21–31)
CARBON DIOXIDE: 24 MMOL/L (ref 21–31)
CHLORIDE: 110 MMOL/L (ref 97–110)
CHLORIDE: 110 MMOL/L (ref 97–110)
CREATININE,URINE RANDOM: 83.49 MG/DL (ref 20–320)
CREATININE: 1.45 MG/DL (ref 0.44–1)
CREATININE: 1.85 MG/DL (ref 0.44–1)
EOSINOPHILS #: 0.1 10^3/UL (ref 0–0.5)
EOSINOPHILS %: 0.5 % (ref 0–7)
FIO2: 70 %
FIO2: 70 %
GLOBULIN: 2.5 G/DL (ref 1.3–3.2)
GLUCOSE: 101 MG/DL (ref 70–220)
GLUCOSE: 113 MG/DL (ref 70–220)
HEMATOCRIT: 31.7 % (ref 37–47)
HEMOGLOBIN: 10.7 G/DL (ref 12–16)
IMMATURE GRANS #M: 0.06 10^3/UL (ref 0–0.03)
IMMATURE GRANS % (M): 0.6 % (ref 0–0.43)
IMMEDIATE SPIN CROSSMATCH: 1 4
INR: 1.4
LYMPHOCYTES #: 1.2 10^3/UL (ref 0.8–2.9)
LYMPHOCYTES %: 13.3 % (ref 15–51)
MAGNESIUM: 3 MG/DL (ref 1.7–2.5)
MEAN CORPUSCULAR HEMOGLOBIN: 29.6 PG (ref 29–33)
MEAN CORPUSCULAR HGB CONC: 33.8 G/DL (ref 32–37)
MEAN CORPUSCULAR VOLUME: 87.6 FL (ref 82–101)
MEAN PLATELET VOLUME: 9.2 FL (ref 7.4–10.4)
METHGB MIXED VENOUS: 0 %
MIXED VENOUS BASE EXCESS: -2.3 MMOL/L
MIXED VENOUS COHB: 0.3 %
MIXED VENOUS FRACTION OXYHGB: 73.6 %
MIXED VENOUS OXYGEN SAT: 73.8 MMHG (ref 65–75)
MIXED VENOUS TOTAL HEMGLOBIN: 11 G/DL
MODE: (no result)
MODE: (no result)
MONOCYTE #: 0.7 10^3/UL (ref 0.3–0.9)
MONOCYTES %: 7 % (ref 0–11)
NEUTROPHIL #: 7.3 10^3/UL (ref 1.6–7.5)
NEUTROPHILS %: 78.5 % (ref 39–77)
NUCLEATED RED BLOOD CELLS #: 0 10^3/UL (ref 0–0)
NUCLEATED RED BLOOD CELLS%: 0.2 /100WBC (ref 0–0)
O2 A-A PPRESDIFF RESPIRATORY: 381.9 MMHG (ref 7–24)
PARTIAL THROMBOPLASTIN TIME: 37.3 SEC (ref 25–35)
PARTIAL THROMBOPLASTIN TIME: 52 SEC (ref 25–35)
PLATELET COUNT: 195 10^3/UL (ref 140–415)
POTASSIUM: 3.2 MMOL/L (ref 3.5–5.1)
POTASSIUM: 5.1 MMOL/L (ref 3.5–5.1)
PROTEIN URINE: 40 MG/DL (ref 0–11.9)
PROTIME: 17.4 SEC (ref 11.9–14.9)
PT RATIO: 1.4
RED BLOOD COUNT: 3.62 10^6/UL (ref 4.2–5.4)
RED CELL DISTRIBUTION WIDTH: 14.4 % (ref 11.5–14.5)
SAMPLE TYPE: (no result)
SODIUM,URINE RANDOM: 39 MMOL/L (ref 30–90)
SODIUM: 138 MMOL/L (ref 135–144)
SODIUM: 145 MMOL/L (ref 135–144)
TOTAL PROTEIN: 5.7 G/DL (ref 6.1–8.1)
UR ASCORBIC ACID: NEGATIVE MG/DL
UR BACTERIA: (no result) /HPF
UR BILIRUBIN (DIP): NEGATIVE MG/DL
UR BLOOD (DIP): NEGATIVE MG/DL
UR CLARITY: CLEAR
UR COLOR: YELLOW
UR GLUCOSE (DIP): NEGATIVE MG/DL
UR KETONES (DIP): NEGATIVE MG/DL
UR LEUKOCYTE ESTERASE (DIP): (no result) LEU/UL
UR MUCUS: (no result) /HPF
UR NITRITE (DIP): NEGATIVE MG/DL
UR PH (DIP): 5 (ref 5–9)
UR RBC: 0 /HPF (ref 0–5)
UR SPECIFIC GRAVITY (DIP): 1.02 (ref 1–1.03)
UR TOTAL PROTEIN (DIP): (no result) MG/DL
UR UROBILINOGEN (DIP): NEGATIVE MG/DL
UR WBC: 4 /HPF (ref 0–5)
WHITE BLOOD COUNT: 9.3 10^3/UL (ref 4.8–10.8)

## 2018-09-18 RX ADMIN — ASPIRIN 1 MG: 600 SUPPOSITORY RECTAL at 13:00

## 2018-09-18 RX ADMIN — POTASSIUM CHLORIDE 1 MLS/HR: 200 INJECTION, SOLUTION INTRAVENOUS at 21:35

## 2018-09-18 RX ADMIN — ONDANSETRON HYDROCHLORIDE 1 MG: 2 INJECTION, SOLUTION INTRAMUSCULAR; INTRAVENOUS at 03:22

## 2018-09-18 RX ADMIN — ASPIRIN 81 MG CHEWABLE TABLET 1 MG: 81 TABLET CHEWABLE at 09:09

## 2018-09-18 RX ADMIN — HEPARIN SODIUM 1 UNIT: 1000 INJECTION, SOLUTION INTRAVENOUS; SUBCUTANEOUS at 12:42

## 2018-09-18 RX ADMIN — VANCOMYCIN HYDROCHLORIDE 1 GM: 1 INJECTION, POWDER, LYOPHILIZED, FOR SOLUTION INTRAVENOUS at 12:42

## 2018-09-18 RX ADMIN — NITROGLYCERIN 1 MLS/HR: 20 INJECTION INTRAVENOUS at 19:12

## 2018-09-18 RX ADMIN — ONDANSETRON HYDROCHLORIDE 1 MG: 2 INJECTION, SOLUTION INTRAMUSCULAR; INTRAVENOUS at 11:32

## 2018-09-18 RX ADMIN — POTASSIUM CHLORIDE 1 MLS/HR: 200 INJECTION, SOLUTION INTRAVENOUS at 22:40

## 2018-09-18 RX ADMIN — ASCORBIC ACID, VITAMIN A PALMITATE, CHOLECALCIFEROL, THIAMINE HYDROCHLORIDE, RIBOFLAVIN-5 PHOSPHATE SODIUM, PYRIDOXINE HYDROCHLORIDE, NIACINAMIDE, DEXPANTHENOL, ALPHA-TOCOPHEROL ACETATE, VITAMIN K1, FOLIC ACID, BIOTIN, CYANOCOBALAMIN 1 MLS/HR: 200; 3300; 200; 6; 3.6; 6; 40; 15; 10; 150; 600; 60; 5 INJECTION, SOLUTION INTRAVENOUS at 19:14

## 2018-09-18 RX ADMIN — HYDROMORPHONE HYDROCHLORIDE 1 MG: 1 INJECTION, SOLUTION INTRAMUSCULAR; INTRAVENOUS; SUBCUTANEOUS at 22:17

## 2018-09-18 RX ADMIN — VASOPRESSIN 1 MLS/HR: 20 INJECTION, SOLUTION INTRAMUSCULAR; SUBCUTANEOUS at 19:45

## 2018-09-18 RX ADMIN — Medication 1 MLS/HR: at 13:00

## 2018-09-18 RX ADMIN — MILRINONE LACTATE 1 MLS/HR: 200 INJECTION, SOLUTION INTRAVENOUS at 19:35

## 2018-09-18 RX ADMIN — CEFAZOLIN 1 MLS/HR: 1 INJECTION, POWDER, FOR SOLUTION INTRAMUSCULAR; INTRAVENOUS at 19:13

## 2018-09-18 RX ADMIN — PROPOFOL 1 MLS/HR: 10 INJECTION, EMULSION INTRAVENOUS at 19:12

## 2018-09-18 RX ADMIN — SODIUM BICARBONATE 1 ML: 84 INJECTION INTRAVENOUS at 19:12

## 2018-09-18 RX ADMIN — EPINEPHRINE 1 MLS/HR: 1 INJECTION, SOLUTION, CONCENTRATE INTRAVENOUS at 19:30

## 2018-09-18 RX ADMIN — HEPARIN SODIUM 1 MLS/HR: 10000 INJECTION, SOLUTION INTRAVENOUS; SUBCUTANEOUS at 13:00

## 2018-09-18 RX ADMIN — PAPAVERINE HYDROCHLORIDE 1 MG: 30 INJECTION, SOLUTION INTRAVENOUS at 12:43

## 2018-09-18 RX ADMIN — HYDROMORPHONE HYDROCHLORIDE 1 MG: 1 INJECTION, SOLUTION INTRAMUSCULAR; INTRAVENOUS; SUBCUTANEOUS at 23:53

## 2018-09-18 RX ADMIN — POTASSIUM CHLORIDE 1 MLS/HR: 200 INJECTION, SOLUTION INTRAVENOUS at 20:32

## 2018-09-18 RX ADMIN — MORPHINE SULFATE 1 MG: 2 INJECTION, SOLUTION INTRAMUSCULAR; INTRAVENOUS at 03:19

## 2018-09-18 RX ADMIN — PANTOPRAZOLE SODIUM 1 MG: 40 TABLET, DELAYED RELEASE ORAL at 06:16

## 2018-09-18 RX ADMIN — FAMOTIDINE 1 MG: 10 INJECTION, SOLUTION INTRAVENOUS at 19:33

## 2018-09-18 RX ADMIN — MILRINONE LACTATE 1 MLS/HR: 1 INJECTION, SOLUTION INTRAVENOUS at 13:00

## 2018-09-19 LAB
ADD MAN DIFF?: NO
ANION GAP: 14 (ref 8–16)
ARTERIAL BASE EXCESS: -5.5 MMOL/L (ref -3–3)
ARTERIAL BLOOD GAS OXYGEN SAT: 98.3 MMHG (ref 95–98)
ARTERIAL COHB: 0.3 % (ref 0–3)
ARTERIAL FRACTION OF OXYHGB: 97.8 % (ref 93–99)
ARTERIAL HCO3: 19.2 MMOL/L (ref 22–26)
ARTERIAL METHB: 0.2 % (ref 0–1.5)
ARTERIAL PCO2: 34.4 MMHG (ref 35–45)
ARTERIAL TOTAL HEMGLOBIN: 10.4 G/DL (ref 12–18)
BASOPHIL #: 0 10^3/UL (ref 0–0.1)
BASOPHILS %: 0.1 % (ref 0–2)
BLOOD GAS LOW PEEP SETTING: 5 CMH2O
BLOOD GAS PIP: 16
BLOOD GAS PS: 10
BLOOD GAS TIDAL VOLUME: 550 ML
BLOOD UREA NITROGEN: 34 MG/DL (ref 7–20)
CALCIUM: 7.9 MG/DL (ref 8.4–10.2)
CARBON DIOXIDE: 22 MMOL/L (ref 21–31)
CHLORIDE: 112 MMOL/L (ref 97–110)
CREATININE: 1.53 MG/DL (ref 0.44–1)
EOSINOPHILS #: 0 10^3/UL (ref 0–0.5)
EOSINOPHILS %: 0.1 % (ref 0–7)
FIO2: 50 %
GLUCOSE: 129 MG/DL (ref 70–220)
HEMATOCRIT: 29.8 % (ref 37–47)
HEMATOCRIT: 31.6 % (ref 37–47)
HEMOGLOBIN: 10.6 G/DL (ref 12–16)
IMMATURE GRANS #M: 0.03 10^3/UL (ref 0–0.03)
IMMATURE GRANS % (M): 0.3 % (ref 0–0.43)
INR: 1.31
LYMPHOCYTES #: 1 10^3/UL (ref 0.8–2.9)
LYMPHOCYTES %: 10.1 % (ref 15–51)
MAGNESIUM: 2.3 MG/DL (ref 1.7–2.5)
MEAN CORPUSCULAR HEMOGLOBIN: 29.6 PG (ref 29–33)
MEAN CORPUSCULAR HGB CONC: 33.5 G/DL (ref 32–37)
MEAN CORPUSCULAR VOLUME: 88.3 FL (ref 82–101)
MEAN PLATELET VOLUME: 10.2 FL (ref 7.4–10.4)
MODE: (no result)
MONOCYTE #: 0.6 10^3/UL (ref 0.3–0.9)
MONOCYTES %: 6.5 % (ref 0–11)
NEUTROPHIL #: 7.8 10^3/UL (ref 1.6–7.5)
NEUTROPHILS %: 82.9 % (ref 39–77)
NUCLEATED RED BLOOD CELLS #: 0 10^3/UL (ref 0–0)
NUCLEATED RED BLOOD CELLS%: 0 /100WBC (ref 0–0)
O2 A-A PPRESDIFF RESPIRATORY: 147 MMHG (ref 7–24)
PARTIAL THROMBOPLASTIN TIME: 36 SEC (ref 25–35)
PHOSPHORUS: 3.4 MG/DL (ref 2.5–4.9)
PLATELET COUNT: 213 10^3/UL (ref 140–415)
POTASSIUM: 3.6 MMOL/L (ref 3.5–5.1)
PROTIME: 16.5 SEC (ref 11.9–14.9)
PT RATIO: 1.3
RED BLOOD COUNT: 3.58 10^6/UL (ref 4.2–5.4)
RED CELL DISTRIBUTION WIDTH: 14.8 % (ref 11.5–14.5)
SODIUM: 144 MMOL/L (ref 135–144)
WHITE BLOOD COUNT: 9.4 10^3/UL (ref 4.8–10.8)

## 2018-09-19 RX ADMIN — HYDROMORPHONE HYDROCHLORIDE 1 MG: 1 INJECTION, SOLUTION INTRAMUSCULAR; INTRAVENOUS; SUBCUTANEOUS at 04:00

## 2018-09-19 RX ADMIN — ONDANSETRON HYDROCHLORIDE 1 MG: 2 INJECTION, SOLUTION INTRAMUSCULAR; INTRAVENOUS at 10:20

## 2018-09-19 RX ADMIN — ONDANSETRON HYDROCHLORIDE 1 MG: 2 INJECTION, SOLUTION INTRAMUSCULAR; INTRAVENOUS at 18:11

## 2018-09-19 RX ADMIN — CEFAZOLIN 1 MLS/HR: 1 INJECTION, POWDER, FOR SOLUTION INTRAMUSCULAR; INTRAVENOUS at 10:02

## 2018-09-19 RX ADMIN — ASCORBIC ACID, VITAMIN A PALMITATE, CHOLECALCIFEROL, THIAMINE HYDROCHLORIDE, RIBOFLAVIN-5 PHOSPHATE SODIUM, PYRIDOXINE HYDROCHLORIDE, NIACINAMIDE, DEXPANTHENOL, ALPHA-TOCOPHEROL ACETATE, VITAMIN K1, FOLIC ACID, BIOTIN, CYANOCOBALAMIN 1 MLS/HR: 200; 3300; 200; 6; 3.6; 6; 40; 15; 10; 150; 600; 60; 5 INJECTION, SOLUTION INTRAVENOUS at 18:00

## 2018-09-19 RX ADMIN — OXYCODONE HYDROCHLORIDE AND ACETAMINOPHEN 1 TAB: 5; 325 TABLET ORAL at 22:38

## 2018-09-19 RX ADMIN — FAMOTIDINE 1 MG: 10 INJECTION, SOLUTION INTRAVENOUS at 10:02

## 2018-09-19 RX ADMIN — ONDANSETRON HYDROCHLORIDE 1 MG: 2 INJECTION, SOLUTION INTRAMUSCULAR; INTRAVENOUS at 03:31

## 2018-09-19 RX ADMIN — ATORVASTATIN CALCIUM 1 MG: 80 TABLET, FILM COATED ORAL at 20:24

## 2018-09-19 RX ADMIN — OXYCODONE HYDROCHLORIDE AND ACETAMINOPHEN 1 TAB: 5; 325 TABLET ORAL at 13:57

## 2018-09-19 RX ADMIN — ENOXAPARIN SODIUM 1 MG: 100 INJECTION SUBCUTANEOUS at 10:21

## 2018-09-19 RX ADMIN — HYDROMORPHONE HYDROCHLORIDE 1 MG: 1 INJECTION, SOLUTION INTRAMUSCULAR; INTRAVENOUS; SUBCUTANEOUS at 05:51

## 2018-09-19 RX ADMIN — OXYCODONE HYDROCHLORIDE AND ACETAMINOPHEN 1 TAB: 5; 325 TABLET ORAL at 06:50

## 2018-09-19 RX ADMIN — HYDROMORPHONE HYDROCHLORIDE 1 MG: 1 INJECTION, SOLUTION INTRAMUSCULAR; INTRAVENOUS; SUBCUTANEOUS at 03:00

## 2018-09-19 RX ADMIN — CEFAZOLIN 1 MLS/HR: 1 INJECTION, POWDER, FOR SOLUTION INTRAMUSCULAR; INTRAVENOUS at 01:38

## 2018-09-19 RX ADMIN — OXYCODONE HYDROCHLORIDE AND ACETAMINOPHEN 1 TAB: 5; 325 TABLET ORAL at 03:37

## 2018-09-19 RX ADMIN — OXYCODONE HYDROCHLORIDE AND ACETAMINOPHEN 1 TAB: 5; 325 TABLET ORAL at 00:37

## 2018-09-19 RX ADMIN — OXYCODONE HYDROCHLORIDE AND ACETAMINOPHEN 1 TAB: 5; 325 TABLET ORAL at 18:58

## 2018-09-19 RX ADMIN — ASPIRIN 325 MG ORAL TABLET 1 MG: 325 PILL ORAL at 10:02

## 2018-09-19 RX ADMIN — HYDROMORPHONE HYDROCHLORIDE 1 MG: 1 INJECTION, SOLUTION INTRAMUSCULAR; INTRAVENOUS; SUBCUTANEOUS at 09:06

## 2018-09-19 RX ADMIN — ASCORBIC ACID, VITAMIN A PALMITATE, CHOLECALCIFEROL, THIAMINE HYDROCHLORIDE, RIBOFLAVIN-5 PHOSPHATE SODIUM, PYRIDOXINE HYDROCHLORIDE, NIACINAMIDE, DEXPANTHENOL, ALPHA-TOCOPHEROL ACETATE, VITAMIN K1, FOLIC ACID, BIOTIN, CYANOCOBALAMIN 1 MLS/HR: 200; 3300; 200; 6; 3.6; 6; 40; 15; 10; 150; 600; 60; 5 INJECTION, SOLUTION INTRAVENOUS at 20:24

## 2018-09-20 LAB
ADD MAN DIFF?: NO
ANION GAP: 15 (ref 8–16)
BASOPHIL #: 0 10^3/UL (ref 0–0.1)
BASOPHILS %: 0.1 % (ref 0–2)
BLOOD UREA NITROGEN: 40 MG/DL (ref 7–20)
CALCIUM: 7.8 MG/DL (ref 8.4–10.2)
CARBON DIOXIDE: 21 MMOL/L (ref 21–31)
CHLORIDE: 108 MMOL/L (ref 97–110)
CREATININE: 2 MG/DL (ref 0.44–1)
EOSINOPHILS #: 0.1 10^3/UL (ref 0–0.5)
EOSINOPHILS %: 0.7 % (ref 0–7)
GLUCOSE: 123 MG/DL (ref 70–220)
HEMATOCRIT: 28.3 % (ref 37–47)
HEMOGLOBIN: 9.1 G/DL (ref 12–16)
IMMATURE GRANS #M: 0.05 10^3/UL (ref 0–0.03)
IMMATURE GRANS % (M): 0.6 % (ref 0–0.43)
LYMPHOCYTES #: 1.1 10^3/UL (ref 0.8–2.9)
LYMPHOCYTES %: 13.5 % (ref 15–51)
MAGNESIUM: 2.1 MG/DL (ref 1.7–2.5)
MEAN CORPUSCULAR HEMOGLOBIN: 29.3 PG (ref 29–33)
MEAN CORPUSCULAR HGB CONC: 32.2 G/DL (ref 32–37)
MEAN CORPUSCULAR VOLUME: 91 FL (ref 82–101)
MEAN PLATELET VOLUME: 9.6 FL (ref 7.4–10.4)
MICROALBUMIN/CREATININE RATIO: 117 (ref ?–30)
MICROALBUMIN: 9.7 MG/DL
MONOCYTE #: 0.6 10^3/UL (ref 0.3–0.9)
MONOCYTES %: 7.3 % (ref 0–11)
NEUTROPHIL #: 6.6 10^3/UL (ref 1.6–7.5)
NEUTROPHILS %: 77.8 % (ref 39–77)
NUCLEATED RED BLOOD CELLS #: 0 10^3/UL (ref 0–0)
NUCLEATED RED BLOOD CELLS%: 0 /100WBC (ref 0–0)
PHOSPHORUS: 6 MG/DL (ref 2.5–4.9)
PLATELET COUNT: 169 10^3/UL (ref 140–415)
POTASSIUM: 4.1 MMOL/L (ref 3.5–5.1)
RED BLOOD COUNT: 3.11 10^6/UL (ref 4.2–5.4)
RED CELL DISTRIBUTION WIDTH: 15.4 % (ref 11.5–14.5)
SODIUM: 140 MMOL/L (ref 135–144)
WHITE BLOOD COUNT: 8.4 10^3/UL (ref 4.8–10.8)

## 2018-09-20 RX ADMIN — FAMOTIDINE 1 MG: 10 INJECTION, SOLUTION INTRAVENOUS at 08:29

## 2018-09-20 RX ADMIN — ONDANSETRON HYDROCHLORIDE 1 MG: 2 INJECTION, SOLUTION INTRAMUSCULAR; INTRAVENOUS at 07:03

## 2018-09-20 RX ADMIN — OXYCODONE HYDROCHLORIDE AND ACETAMINOPHEN 1 TAB: 5; 325 TABLET ORAL at 06:12

## 2018-09-20 RX ADMIN — ATORVASTATIN CALCIUM 1 MG: 80 TABLET, FILM COATED ORAL at 20:20

## 2018-09-20 RX ADMIN — OXYCODONE HYDROCHLORIDE AND ACETAMINOPHEN 1 TAB: 5; 325 TABLET ORAL at 02:39

## 2018-09-20 RX ADMIN — OXYCODONE HYDROCHLORIDE AND ACETAMINOPHEN 1 TAB: 5; 325 TABLET ORAL at 23:40

## 2018-09-20 RX ADMIN — INSULIN ASPART 1 UNIT: 100 INJECTION, SOLUTION INTRAVENOUS; SUBCUTANEOUS at 18:25

## 2018-09-20 RX ADMIN — INSULIN ASPART 1 UNIT: 100 INJECTION, SOLUTION INTRAVENOUS; SUBCUTANEOUS at 13:17

## 2018-09-20 RX ADMIN — OXYCODONE HYDROCHLORIDE AND ACETAMINOPHEN 1 TAB: 5; 325 TABLET ORAL at 10:36

## 2018-09-20 RX ADMIN — OXYCODONE HYDROCHLORIDE AND ACETAMINOPHEN 1 TAB: 5; 325 TABLET ORAL at 18:36

## 2018-09-20 RX ADMIN — ENOXAPARIN SODIUM 1 MG: 100 INJECTION SUBCUTANEOUS at 08:33

## 2018-09-20 RX ADMIN — INSULIN ASPART 1 UNIT: 100 INJECTION, SOLUTION INTRAVENOUS; SUBCUTANEOUS at 20:33

## 2018-09-20 RX ADMIN — ASPIRIN 325 MG ORAL TABLET 1 MG: 325 PILL ORAL at 08:29

## 2018-09-20 RX ADMIN — INSULIN ASPART 1 UNIT: 100 INJECTION, SOLUTION INTRAVENOUS; SUBCUTANEOUS at 08:34

## 2018-09-20 RX ADMIN — OXYCODONE HYDROCHLORIDE AND ACETAMINOPHEN 1 TAB: 5; 325 TABLET ORAL at 15:28

## 2018-09-20 RX ADMIN — ONDANSETRON HYDROCHLORIDE 1 MG: 2 INJECTION, SOLUTION INTRAMUSCULAR; INTRAVENOUS at 17:00

## 2018-09-20 RX ADMIN — HYDROMORPHONE HYDROCHLORIDE 1 MG: 1 INJECTION, SOLUTION INTRAMUSCULAR; INTRAVENOUS; SUBCUTANEOUS at 17:00

## 2018-09-21 LAB
ADD MAN DIFF?: NO
ANION GAP: 12 (ref 8–16)
BASOPHIL #: 0 10^3/UL (ref 0–0.1)
BASOPHILS %: 0.2 % (ref 0–2)
BLOOD UREA NITROGEN: 50 MG/DL (ref 7–20)
CALCIUM: 8 MG/DL (ref 8.4–10.2)
CARBON DIOXIDE: 26 MMOL/L (ref 21–31)
CHLORIDE: 103 MMOL/L (ref 97–110)
CREATININE: 2.08 MG/DL (ref 0.44–1)
EOSINOPHILS #: 0.3 10^3/UL (ref 0–0.5)
EOSINOPHILS %: 2.9 % (ref 0–7)
GLUCOSE: 178 MG/DL (ref 70–220)
HEMATOCRIT: 30 % (ref 37–47)
HEMOGLOBIN: 9.4 G/DL (ref 12–16)
IMMATURE GRANS #M: 0.05 10^3/UL (ref 0–0.03)
IMMATURE GRANS % (M): 0.5 % (ref 0–0.43)
LYMPHOCYTES #: 1.3 10^3/UL (ref 0.8–2.9)
LYMPHOCYTES %: 13.4 % (ref 15–51)
MAGNESIUM: 2.2 MG/DL (ref 1.7–2.5)
MEAN CORPUSCULAR HEMOGLOBIN: 29.4 PG (ref 29–33)
MEAN CORPUSCULAR HGB CONC: 31.3 G/DL (ref 32–37)
MEAN CORPUSCULAR VOLUME: 93.8 FL (ref 82–101)
MEAN PLATELET VOLUME: 9.8 FL (ref 7.4–10.4)
MONOCYTE #: 0.8 10^3/UL (ref 0.3–0.9)
MONOCYTES %: 8 % (ref 0–11)
NEUTROPHIL #: 7.4 10^3/UL (ref 1.6–7.5)
NEUTROPHILS %: 75 % (ref 39–77)
NUCLEATED RED BLOOD CELLS #: 0 10^3/UL (ref 0–0)
NUCLEATED RED BLOOD CELLS%: 0 /100WBC (ref 0–0)
PHOSPHORUS: 5.8 MG/DL (ref 2.5–4.9)
PLATELET COUNT: 194 10^3/UL (ref 140–415)
POTASSIUM: 4.5 MMOL/L (ref 3.5–5.1)
RED BLOOD COUNT: 3.2 10^6/UL (ref 4.2–5.4)
RED CELL DISTRIBUTION WIDTH: 15 % (ref 11.5–14.5)
SODIUM: 136 MMOL/L (ref 135–144)
WHITE BLOOD COUNT: 9.8 10^3/UL (ref 4.8–10.8)

## 2018-09-21 RX ADMIN — OXYCODONE HYDROCHLORIDE AND ACETAMINOPHEN 1 TAB: 5; 325 TABLET ORAL at 14:12

## 2018-09-21 RX ADMIN — FUROSEMIDE 1 MG: 10 INJECTION, SOLUTION INTRAVENOUS at 18:15

## 2018-09-21 RX ADMIN — OXYCODONE HYDROCHLORIDE AND ACETAMINOPHEN 1 TAB: 5; 325 TABLET ORAL at 22:03

## 2018-09-21 RX ADMIN — OXYCODONE HYDROCHLORIDE AND ACETAMINOPHEN 1 TAB: 5; 325 TABLET ORAL at 10:07

## 2018-09-21 RX ADMIN — ENOXAPARIN SODIUM 1 MG: 100 INJECTION SUBCUTANEOUS at 08:27

## 2018-09-21 RX ADMIN — ATORVASTATIN CALCIUM 1 MG: 80 TABLET, FILM COATED ORAL at 21:18

## 2018-09-21 RX ADMIN — INSULIN ASPART 1 UNIT: 100 INJECTION, SOLUTION INTRAVENOUS; SUBCUTANEOUS at 12:07

## 2018-09-21 RX ADMIN — INSULIN ASPART 1 UNIT: 100 INJECTION, SOLUTION INTRAVENOUS; SUBCUTANEOUS at 21:30

## 2018-09-21 RX ADMIN — ONDANSETRON HYDROCHLORIDE 1 MG: 2 INJECTION, SOLUTION INTRAMUSCULAR; INTRAVENOUS at 22:03

## 2018-09-21 RX ADMIN — FUROSEMIDE 1 MG: 10 INJECTION, SOLUTION INTRAVENOUS at 12:04

## 2018-09-21 RX ADMIN — OXYCODONE HYDROCHLORIDE AND ACETAMINOPHEN 1 TAB: 5; 325 TABLET ORAL at 06:19

## 2018-09-21 RX ADMIN — FAMOTIDINE 1 MG: 10 INJECTION, SOLUTION INTRAVENOUS at 08:26

## 2018-09-21 RX ADMIN — ASPIRIN 325 MG ORAL TABLET 1 MG: 325 PILL ORAL at 08:26

## 2018-09-21 RX ADMIN — INSULIN ASPART 1 UNIT: 100 INJECTION, SOLUTION INTRAVENOUS; SUBCUTANEOUS at 17:27

## 2018-09-21 RX ADMIN — INSULIN ASPART 1 UNIT: 100 INJECTION, SOLUTION INTRAVENOUS; SUBCUTANEOUS at 08:27

## 2018-09-22 LAB
ADD MAN DIFF?: NO
ANION GAP: 15 (ref 8–16)
BASOPHIL #: 0 10^3/UL (ref 0–0.1)
BASOPHILS %: 0.3 % (ref 0–2)
BLOOD UREA NITROGEN: 51 MG/DL (ref 7–20)
CALCIUM: 8.3 MG/DL (ref 8.4–10.2)
CARBON DIOXIDE: 23 MMOL/L (ref 21–31)
CHLORIDE: 104 MMOL/L (ref 97–110)
CREATININE: 1.28 MG/DL (ref 0.44–1)
EOSINOPHILS #: 0.4 10^3/UL (ref 0–0.5)
EOSINOPHILS %: 4 % (ref 0–7)
GLUCOSE: 226 MG/DL (ref 70–220)
HEMATOCRIT: 31.7 % (ref 37–47)
HEMOGLOBIN: 10.2 G/DL (ref 12–16)
IMMATURE GRANS #M: 0.04 10^3/UL (ref 0–0.03)
IMMATURE GRANS % (M): 0.4 % (ref 0–0.43)
LYMPHOCYTES #: 1 10^3/UL (ref 0.8–2.9)
LYMPHOCYTES %: 10.9 % (ref 15–51)
MAGNESIUM: 2 MG/DL (ref 1.7–2.5)
MEAN CORPUSCULAR HEMOGLOBIN: 29.6 PG (ref 29–33)
MEAN CORPUSCULAR HGB CONC: 32.2 G/DL (ref 32–37)
MEAN CORPUSCULAR VOLUME: 91.9 FL (ref 82–101)
MEAN PLATELET VOLUME: 9.5 FL (ref 7.4–10.4)
MONOCYTE #: 0.7 10^3/UL (ref 0.3–0.9)
MONOCYTES %: 7.4 % (ref 0–11)
NEUTROPHIL #: 7.3 10^3/UL (ref 1.6–7.5)
NEUTROPHILS %: 77 % (ref 39–77)
NUCLEATED RED BLOOD CELLS #: 0 10^3/UL (ref 0–0)
NUCLEATED RED BLOOD CELLS%: 0 /100WBC (ref 0–0)
PHOSPHORUS: 3.8 MG/DL (ref 2.5–4.9)
PLATELET COUNT: 257 10^3/UL (ref 140–415)
POTASSIUM: 4.3 MMOL/L (ref 3.5–5.1)
RED BLOOD COUNT: 3.45 10^6/UL (ref 4.2–5.4)
RED CELL DISTRIBUTION WIDTH: 14.5 % (ref 11.5–14.5)
SODIUM: 138 MMOL/L (ref 135–144)
WHITE BLOOD COUNT: 9.5 10^3/UL (ref 4.8–10.8)

## 2018-09-22 RX ADMIN — INSULIN ASPART 1 UNIT: 100 INJECTION, SOLUTION INTRAVENOUS; SUBCUTANEOUS at 08:00

## 2018-09-22 RX ADMIN — ENOXAPARIN SODIUM 1 MG: 100 INJECTION SUBCUTANEOUS at 13:21

## 2018-09-22 RX ADMIN — HEPARIN SODIUM 1 UNIT: 1000 INJECTION, SOLUTION INTRAVENOUS; SUBCUTANEOUS at 11:31

## 2018-09-22 RX ADMIN — FAMOTIDINE 1 MG: 10 INJECTION, SOLUTION INTRAVENOUS at 13:15

## 2018-09-22 RX ADMIN — INSULIN ASPART 1 UNIT: 100 INJECTION, SOLUTION INTRAVENOUS; SUBCUTANEOUS at 13:20

## 2018-09-22 RX ADMIN — THROMBIN TOPICAL RECOMBINANT 1 UNITS: KIT at 11:33

## 2018-09-22 RX ADMIN — INSULIN ASPART 1 UNIT: 100 INJECTION, SOLUTION INTRAVENOUS; SUBCUTANEOUS at 23:11

## 2018-09-22 RX ADMIN — LIDOCAINE HYDROCHLORIDE 1 ML: 10; .005 INJECTION, SOLUTION EPIDURAL; INFILTRATION; INTRACAUDAL; PERINEURAL at 11:32

## 2018-09-22 RX ADMIN — CEFAZOLIN 1 MLS/HR: 1 INJECTION, POWDER, FOR SOLUTION INTRAMUSCULAR; INTRAVENOUS at 14:31

## 2018-09-22 RX ADMIN — OXYCODONE HYDROCHLORIDE AND ACETAMINOPHEN 1 TAB: 5; 325 TABLET ORAL at 11:36

## 2018-09-22 RX ADMIN — INSULIN ASPART 1 UNIT: 100 INJECTION, SOLUTION INTRAVENOUS; SUBCUTANEOUS at 21:22

## 2018-09-22 RX ADMIN — ATORVASTATIN CALCIUM 1 MG: 80 TABLET, FILM COATED ORAL at 21:05

## 2018-09-22 RX ADMIN — ONDANSETRON HYDROCHLORIDE 1 MG: 2 INJECTION, SOLUTION INTRAMUSCULAR; INTRAVENOUS at 11:36

## 2018-09-22 RX ADMIN — OXYCODONE HYDROCHLORIDE AND ACETAMINOPHEN 1 TAB: 5; 325 TABLET ORAL at 19:23

## 2018-09-22 RX ADMIN — INSULIN ASPART 1 UNIT: 100 INJECTION, SOLUTION INTRAVENOUS; SUBCUTANEOUS at 21:07

## 2018-09-22 RX ADMIN — FENTANYL CITRATE 1 MCG: 50 INJECTION, SOLUTION INTRAMUSCULAR; INTRAVENOUS at 10:53

## 2018-09-22 RX ADMIN — OXYCODONE HYDROCHLORIDE AND ACETAMINOPHEN 1 TAB: 5; 325 TABLET ORAL at 23:29

## 2018-09-22 RX ADMIN — CEFAZOLIN 1 MLS/HR: 1 INJECTION, POWDER, FOR SOLUTION INTRAMUSCULAR; INTRAVENOUS at 21:06

## 2018-09-22 RX ADMIN — ASPIRIN 325 MG ORAL TABLET 1 MG: 325 PILL ORAL at 13:10

## 2018-09-23 LAB
ADD UMIC: YES
UR ASCORBIC ACID: 20 MG/DL
UR BACTERIA: (no result) /HPF
UR BILIRUBIN (DIP): NEGATIVE MG/DL
UR BLOOD (DIP): (no result) MG/DL
UR CLARITY: (no result)
UR COLOR: YELLOW
UR GLUCOSE (DIP): (no result) MG/DL
UR KETONES (DIP): NEGATIVE MG/DL
UR LEUKOCYTE ESTERASE (DIP): (no result) LEU/UL
UR NITRITE (DIP): NEGATIVE MG/DL
UR NONSQUAMOUS EPITHELIAL CELL: 2 /HPF
UR PH (DIP): 6 (ref 5–9)
UR RBC: 4 /HPF (ref 0–5)
UR SPECIFIC GRAVITY (DIP): 1.01 (ref 1–1.03)
UR SQUAMOUS EPITHELIAL CELL: (no result) /HPF
UR TOTAL PROTEIN (DIP): NEGATIVE MG/DL
UR UROBILINOGEN (DIP): NEGATIVE MG/DL
UR WBC: 3 /HPF (ref 0–5)

## 2018-09-23 RX ADMIN — INSULIN GLARGINE 1 UNITS: 100 INJECTION, SOLUTION SUBCUTANEOUS at 00:15

## 2018-09-23 RX ADMIN — INSULIN ASPART 1 UNIT: 100 INJECTION, SOLUTION INTRAVENOUS; SUBCUTANEOUS at 12:15

## 2018-09-23 RX ADMIN — ENOXAPARIN SODIUM 1 MG: 100 INJECTION SUBCUTANEOUS at 09:15

## 2018-09-23 RX ADMIN — OXYCODONE HYDROCHLORIDE AND ACETAMINOPHEN 1 TAB: 5; 325 TABLET ORAL at 20:55

## 2018-09-23 RX ADMIN — ASPIRIN 325 MG ORAL TABLET 1 MG: 325 PILL ORAL at 09:08

## 2018-09-23 RX ADMIN — FAMOTIDINE 1 MG: 10 INJECTION, SOLUTION INTRAVENOUS at 09:08

## 2018-09-23 RX ADMIN — INSULIN ASPART 1 UNIT: 100 INJECTION, SOLUTION INTRAVENOUS; SUBCUTANEOUS at 21:02

## 2018-09-23 RX ADMIN — OXYCODONE HYDROCHLORIDE AND ACETAMINOPHEN 1 TAB: 5; 325 TABLET ORAL at 15:48

## 2018-09-23 RX ADMIN — CEFAZOLIN 1 MLS/HR: 1 INJECTION, POWDER, FOR SOLUTION INTRAMUSCULAR; INTRAVENOUS at 21:16

## 2018-09-23 RX ADMIN — INSULIN ASPART 1 UNIT: 100 INJECTION, SOLUTION INTRAVENOUS; SUBCUTANEOUS at 18:37

## 2018-09-23 RX ADMIN — OXYCODONE HYDROCHLORIDE AND ACETAMINOPHEN 1 TAB: 5; 325 TABLET ORAL at 07:02

## 2018-09-23 RX ADMIN — INSULIN GLARGINE 1 UNITS: 100 INJECTION, SOLUTION SUBCUTANEOUS at 21:02

## 2018-09-23 RX ADMIN — OXYCODONE HYDROCHLORIDE AND ACETAMINOPHEN 1 TAB: 5; 325 TABLET ORAL at 03:34

## 2018-09-23 RX ADMIN — CEFAZOLIN 1 MLS/HR: 1 INJECTION, POWDER, FOR SOLUTION INTRAMUSCULAR; INTRAVENOUS at 15:41

## 2018-09-23 RX ADMIN — INSULIN ASPART 1 UNIT: 100 INJECTION, SOLUTION INTRAVENOUS; SUBCUTANEOUS at 08:20

## 2018-09-23 RX ADMIN — ATORVASTATIN CALCIUM 1 MG: 80 TABLET, FILM COATED ORAL at 20:55

## 2018-09-23 RX ADMIN — CEFAZOLIN 1 MLS/HR: 1 INJECTION, POWDER, FOR SOLUTION INTRAMUSCULAR; INTRAVENOUS at 07:02

## 2018-09-24 LAB
ADD MAN DIFF?: NO
ANION GAP: 12 (ref 8–16)
BASOPHIL #: 0 10^3/UL (ref 0–0.1)
BASOPHILS %: 0.3 % (ref 0–2)
BLOOD UREA NITROGEN: 32 MG/DL (ref 7–20)
CALCIUM: 8.2 MG/DL (ref 8.4–10.2)
CARBON DIOXIDE: 27 MMOL/L (ref 21–31)
CHLORIDE: 105 MMOL/L (ref 97–110)
CREATININE: 0.82 MG/DL (ref 0.44–1)
EOSINOPHILS #: 0.5 10^3/UL (ref 0–0.5)
EOSINOPHILS %: 4.3 % (ref 0–7)
GLUCOSE: 187 MG/DL (ref 70–220)
HEMATOCRIT: 30.7 % (ref 37–47)
HEMOGLOBIN: 9.9 G/DL (ref 12–16)
IMMATURE GRANS #M: 0.06 10^3/UL (ref 0–0.03)
IMMATURE GRANS % (M): 0.5 % (ref 0–0.43)
LYMPHOCYTES #: 2.4 10^3/UL (ref 0.8–2.9)
LYMPHOCYTES %: 21.4 % (ref 15–51)
MAGNESIUM: 1.6 MG/DL (ref 1.7–2.5)
MEAN CORPUSCULAR HEMOGLOBIN: 29.6 PG (ref 29–33)
MEAN CORPUSCULAR HGB CONC: 32.2 G/DL (ref 32–37)
MEAN CORPUSCULAR VOLUME: 91.6 FL (ref 82–101)
MEAN PLATELET VOLUME: 9.4 FL (ref 7.4–10.4)
MONOCYTE #: 0.8 10^3/UL (ref 0.3–0.9)
MONOCYTES %: 7.3 % (ref 0–11)
NEUTROPHIL #: 7.4 10^3/UL (ref 1.6–7.5)
NEUTROPHILS %: 66.2 % (ref 39–77)
NUCLEATED RED BLOOD CELLS #: 0 10^3/UL (ref 0–0)
NUCLEATED RED BLOOD CELLS%: 0 /100WBC (ref 0–0)
PHOSPHORUS: 2.7 MG/DL (ref 2.5–4.9)
PLATELET COUNT: 362 10^3/UL (ref 140–415)
POTASSIUM: 4.5 MMOL/L (ref 3.5–5.1)
RED BLOOD COUNT: 3.35 10^6/UL (ref 4.2–5.4)
RED CELL DISTRIBUTION WIDTH: 13.9 % (ref 11.5–14.5)
SODIUM: 139 MMOL/L (ref 135–144)
WHITE BLOOD COUNT: 11.1 10^3/UL (ref 4.8–10.8)

## 2018-09-24 RX ADMIN — CEFAZOLIN 1 MLS/HR: 1 INJECTION, POWDER, FOR SOLUTION INTRAMUSCULAR; INTRAVENOUS at 06:04

## 2018-09-24 RX ADMIN — OXYCODONE HYDROCHLORIDE AND ACETAMINOPHEN 1 TAB: 10; 325 TABLET ORAL at 20:31

## 2018-09-24 RX ADMIN — ENOXAPARIN SODIUM 1 MG: 100 INJECTION SUBCUTANEOUS at 09:04

## 2018-09-24 RX ADMIN — INSULIN ASPART 1 UNIT: 100 INJECTION, SOLUTION INTRAVENOUS; SUBCUTANEOUS at 20:35

## 2018-09-24 RX ADMIN — MAGNESIUM SULFATE HEPTAHYDRATE 1 MLS/HR: 40 INJECTION, SOLUTION INTRAVENOUS at 09:46

## 2018-09-24 RX ADMIN — INSULIN GLARGINE 1 UNITS: 100 INJECTION, SOLUTION SUBCUTANEOUS at 21:34

## 2018-09-24 RX ADMIN — INSULIN ASPART 1 UNIT: 100 INJECTION, SOLUTION INTRAVENOUS; SUBCUTANEOUS at 17:09

## 2018-09-24 RX ADMIN — INSULIN ASPART 1 UNIT: 100 INJECTION, SOLUTION INTRAVENOUS; SUBCUTANEOUS at 17:15

## 2018-09-24 RX ADMIN — IPRATROPIUM BROMIDE AND ALBUTEROL SULFATE 1 ML: .5; 3 SOLUTION RESPIRATORY (INHALATION) at 20:45

## 2018-09-24 RX ADMIN — ASPIRIN 325 MG ORAL TABLET 1 MG: 325 PILL ORAL at 08:54

## 2018-09-24 RX ADMIN — FAMOTIDINE 1 MG: 10 INJECTION, SOLUTION INTRAVENOUS at 08:55

## 2018-09-24 RX ADMIN — OXYCODONE HYDROCHLORIDE AND ACETAMINOPHEN 1 TAB: 5; 325 TABLET ORAL at 09:19

## 2018-09-24 RX ADMIN — INSULIN ASPART 1 UNIT: 100 INJECTION, SOLUTION INTRAVENOUS; SUBCUTANEOUS at 08:19

## 2018-09-24 RX ADMIN — INSULIN ASPART 1 UNIT: 100 INJECTION, SOLUTION INTRAVENOUS; SUBCUTANEOUS at 12:11

## 2018-09-24 RX ADMIN — OXYCODONE HYDROCHLORIDE AND ACETAMINOPHEN 1 TAB: 5; 325 TABLET ORAL at 02:06

## 2018-09-24 RX ADMIN — ATORVASTATIN CALCIUM 1 MG: 80 TABLET, FILM COATED ORAL at 20:30

## 2018-09-25 LAB
ADD MAN DIFF?: NO
ANION GAP: 13 (ref 8–16)
BASOPHIL #: 0 10^3/UL (ref 0–0.1)
BASOPHILS %: 0.3 % (ref 0–2)
BLOOD UREA NITROGEN: 21 MG/DL (ref 7–20)
CALCIUM: 8.6 MG/DL (ref 8.4–10.2)
CARBON DIOXIDE: 31 MMOL/L (ref 21–31)
CHLORIDE: 99 MMOL/L (ref 97–110)
CREATININE: 0.73 MG/DL (ref 0.44–1)
EOSINOPHILS #: 0.3 10^3/UL (ref 0–0.5)
EOSINOPHILS %: 3 % (ref 0–7)
GLUCOSE: 149 MG/DL (ref 70–220)
HEMATOCRIT: 35.2 % (ref 37–47)
HEMOGLOBIN: 10.8 G/DL (ref 12–16)
IMMATURE GRANS #M: 0.07 10^3/UL (ref 0–0.03)
IMMATURE GRANS % (M): 0.6 % (ref 0–0.43)
LYMPHOCYTES #: 1.9 10^3/UL (ref 0.8–2.9)
LYMPHOCYTES %: 16.7 % (ref 15–51)
MAGNESIUM: 1.4 MG/DL (ref 1.7–2.5)
MEAN CORPUSCULAR HEMOGLOBIN: 28.6 PG (ref 29–33)
MEAN CORPUSCULAR HGB CONC: 30.7 G/DL (ref 32–37)
MEAN CORPUSCULAR VOLUME: 93.4 FL (ref 82–101)
MEAN PLATELET VOLUME: 9 FL (ref 7.4–10.4)
MONOCYTE #: 0.7 10^3/UL (ref 0.3–0.9)
MONOCYTES %: 6.2 % (ref 0–11)
NEUTROPHIL #: 8.3 10^3/UL (ref 1.6–7.5)
NEUTROPHILS %: 73.2 % (ref 39–77)
NUCLEATED RED BLOOD CELLS #: 0 10^3/UL (ref 0–0)
NUCLEATED RED BLOOD CELLS%: 0 /100WBC (ref 0–0)
PHOSPHORUS: 3.3 MG/DL (ref 2.5–4.9)
PLATELET COUNT: 415 10^3/UL (ref 140–415)
POTASSIUM: 3.8 MMOL/L (ref 3.5–5.1)
RED BLOOD COUNT: 3.77 10^6/UL (ref 4.2–5.4)
RED CELL DISTRIBUTION WIDTH: 13.9 % (ref 11.5–14.5)
SODIUM: 139 MMOL/L (ref 135–144)
WHITE BLOOD COUNT: 11.3 10^3/UL (ref 4.8–10.8)

## 2018-09-25 RX ADMIN — IPRATROPIUM BROMIDE AND ALBUTEROL SULFATE 1 ML: .5; 3 SOLUTION RESPIRATORY (INHALATION) at 00:58

## 2018-09-25 RX ADMIN — IPRATROPIUM BROMIDE AND ALBUTEROL SULFATE 1 ML: .5; 3 SOLUTION RESPIRATORY (INHALATION) at 14:02

## 2018-09-25 RX ADMIN — ENOXAPARIN SODIUM 1 MG: 100 INJECTION SUBCUTANEOUS at 08:17

## 2018-09-25 RX ADMIN — ATORVASTATIN CALCIUM 1 MG: 80 TABLET, FILM COATED ORAL at 21:06

## 2018-09-25 RX ADMIN — IPRATROPIUM BROMIDE AND ALBUTEROL SULFATE 1 ML: .5; 3 SOLUTION RESPIRATORY (INHALATION) at 05:04

## 2018-09-25 RX ADMIN — OXYCODONE HYDROCHLORIDE AND ACETAMINOPHEN 1 TAB: 10; 325 TABLET ORAL at 18:56

## 2018-09-25 RX ADMIN — INSULIN ASPART 1 UNIT: 100 INJECTION, SOLUTION INTRAVENOUS; SUBCUTANEOUS at 08:01

## 2018-09-25 RX ADMIN — INSULIN ASPART 1 UNIT: 100 INJECTION, SOLUTION INTRAVENOUS; SUBCUTANEOUS at 17:28

## 2018-09-25 RX ADMIN — OXYCODONE HYDROCHLORIDE AND ACETAMINOPHEN 1 TAB: 10; 325 TABLET ORAL at 05:55

## 2018-09-25 RX ADMIN — FAMOTIDINE 1 MG: 10 INJECTION, SOLUTION INTRAVENOUS at 08:10

## 2018-09-25 RX ADMIN — FUROSEMIDE 1 MG: 10 INJECTION, SOLUTION INTRAVENOUS at 15:55

## 2018-09-25 RX ADMIN — ASPIRIN 1 MG: 81 TABLET, COATED ORAL at 08:10

## 2018-09-25 RX ADMIN — INSULIN ASPART 1 UNIT: 100 INJECTION, SOLUTION INTRAVENOUS; SUBCUTANEOUS at 21:00

## 2018-09-25 RX ADMIN — INSULIN GLARGINE 1 UNITS: 100 INJECTION, SOLUTION SUBCUTANEOUS at 21:08

## 2018-09-25 RX ADMIN — INSULIN ASPART 1 UNIT: 100 INJECTION, SOLUTION INTRAVENOUS; SUBCUTANEOUS at 12:07

## 2018-09-25 RX ADMIN — MAGNESIUM SULFATE HEPTAHYDRATE 1 MLS/HR: 40 INJECTION, SOLUTION INTRAVENOUS at 09:33

## 2018-09-25 RX ADMIN — OXYCODONE HYDROCHLORIDE AND ACETAMINOPHEN 1 TAB: 10; 325 TABLET ORAL at 23:01

## 2018-09-25 RX ADMIN — INSULIN ASPART 1 UNIT: 100 INJECTION, SOLUTION INTRAVENOUS; SUBCUTANEOUS at 08:02

## 2018-09-25 RX ADMIN — INSULIN ASPART 1 UNIT: 100 INJECTION, SOLUTION INTRAVENOUS; SUBCUTANEOUS at 17:17

## 2018-09-26 LAB
ADD MAN DIFF?: NO
ANION GAP: 14 (ref 8–16)
BASOPHIL #: 0 10^3/UL (ref 0–0.1)
BASOPHILS %: 0.2 % (ref 0–2)
BLOOD UREA NITROGEN: 20 MG/DL (ref 7–20)
CALCIUM: 8.6 MG/DL (ref 8.4–10.2)
CARBON DIOXIDE: 29 MMOL/L (ref 21–31)
CHLORIDE: 99 MMOL/L (ref 97–110)
CREATININE: 0.72 MG/DL (ref 0.44–1)
EOSINOPHILS #: 0.5 10^3/UL (ref 0–0.5)
EOSINOPHILS %: 3.6 % (ref 0–7)
GLUCOSE: 138 MG/DL (ref 70–220)
HEMATOCRIT: 32.2 % (ref 37–47)
HEMOGLOBIN: 10.3 G/DL (ref 12–16)
IMMATURE GRANS #M: 0.09 10^3/UL (ref 0–0.03)
IMMATURE GRANS % (M): 0.7 % (ref 0–0.43)
LYMPHOCYTES #: 2.1 10^3/UL (ref 0.8–2.9)
LYMPHOCYTES %: 16.3 % (ref 15–51)
MAGNESIUM: 1.5 MG/DL (ref 1.7–2.5)
MEAN CORPUSCULAR HEMOGLOBIN: 29.2 PG (ref 29–33)
MEAN CORPUSCULAR HGB CONC: 32 G/DL (ref 32–37)
MEAN CORPUSCULAR VOLUME: 91.2 FL (ref 82–101)
MEAN PLATELET VOLUME: 9.1 FL (ref 7.4–10.4)
MONOCYTE #: 0.8 10^3/UL (ref 0.3–0.9)
MONOCYTES %: 6.5 % (ref 0–11)
NEUTROPHIL #: 9.3 10^3/UL (ref 1.6–7.5)
NEUTROPHILS %: 72.7 % (ref 39–77)
NUCLEATED RED BLOOD CELLS #: 0 10^3/UL (ref 0–0)
NUCLEATED RED BLOOD CELLS%: 0 /100WBC (ref 0–0)
PHOSPHORUS: 4 MG/DL (ref 2.5–4.9)
PLATELET COUNT: 413 10^3/UL (ref 140–415)
POTASSIUM: 3.9 MMOL/L (ref 3.5–5.1)
RED BLOOD COUNT: 3.53 10^6/UL (ref 4.2–5.4)
RED CELL DISTRIBUTION WIDTH: 13.8 % (ref 11.5–14.5)
SODIUM: 138 MMOL/L (ref 135–144)
WHITE BLOOD COUNT: 12.8 10^3/UL (ref 4.8–10.8)

## 2018-09-26 RX ADMIN — POLYETHYLENE GLYCOL 3350 1 GM: 17 POWDER, FOR SOLUTION ORAL at 15:48

## 2018-09-26 RX ADMIN — ASPIRIN 1 MG: 81 TABLET, COATED ORAL at 08:06

## 2018-09-26 RX ADMIN — LISINOPRIL 1 MG: 5 TABLET ORAL at 08:13

## 2018-09-26 RX ADMIN — OXYCODONE HYDROCHLORIDE AND ACETAMINOPHEN 1 TAB: 10; 325 TABLET ORAL at 03:47

## 2018-09-26 RX ADMIN — OXYCODONE HYDROCHLORIDE AND ACETAMINOPHEN 1 TAB: 10; 325 TABLET ORAL at 08:43

## 2018-09-26 RX ADMIN — ENOXAPARIN SODIUM 1 MG: 100 INJECTION SUBCUTANEOUS at 08:22

## 2018-09-26 RX ADMIN — INSULIN ASPART 1 UNIT: 100 INJECTION, SOLUTION INTRAVENOUS; SUBCUTANEOUS at 18:03

## 2018-09-26 RX ADMIN — INSULIN ASPART 1 UNIT: 100 INJECTION, SOLUTION INTRAVENOUS; SUBCUTANEOUS at 12:32

## 2018-09-26 RX ADMIN — INSULIN GLARGINE 1 UNITS: 100 INJECTION, SOLUTION SUBCUTANEOUS at 19:55

## 2018-09-26 RX ADMIN — INSULIN ASPART 1 UNIT: 100 INJECTION, SOLUTION INTRAVENOUS; SUBCUTANEOUS at 08:00

## 2018-09-26 RX ADMIN — CLOPIDOGREL 1 MG: 75 TABLET, FILM COATED ORAL at 08:12

## 2018-09-26 RX ADMIN — INSULIN ASPART 1 UNIT: 100 INJECTION, SOLUTION INTRAVENOUS; SUBCUTANEOUS at 17:41

## 2018-09-26 RX ADMIN — OXYCODONE HYDROCHLORIDE AND ACETAMINOPHEN 1 TAB: 10; 325 TABLET ORAL at 22:37

## 2018-09-26 RX ADMIN — FAMOTIDINE 1 MG: 10 INJECTION, SOLUTION INTRAVENOUS at 08:06

## 2018-09-26 RX ADMIN — IPRATROPIUM BROMIDE AND ALBUTEROL SULFATE 1 ML: .5; 3 SOLUTION RESPIRATORY (INHALATION) at 10:30

## 2018-09-26 RX ADMIN — INSULIN ASPART 1 UNIT: 100 INJECTION, SOLUTION INTRAVENOUS; SUBCUTANEOUS at 08:22

## 2018-09-26 RX ADMIN — ATORVASTATIN CALCIUM 1 MG: 80 TABLET, FILM COATED ORAL at 19:53

## 2018-09-26 RX ADMIN — OXYCODONE HYDROCHLORIDE AND ACETAMINOPHEN 1 TAB: 10; 325 TABLET ORAL at 18:35

## 2018-09-26 RX ADMIN — DOCUSATE SODIUM 1 MG: 100 CAPSULE, LIQUID FILLED ORAL at 08:06

## 2018-09-26 RX ADMIN — INSULIN ASPART 1 UNIT: 100 INJECTION, SOLUTION INTRAVENOUS; SUBCUTANEOUS at 19:51

## 2018-09-27 LAB
ADD MAN DIFF?: NO
BASOPHIL #: 0 10^3/UL (ref 0–0.1)
BASOPHILS %: 0.4 % (ref 0–2)
EOSINOPHILS #: 0.4 10^3/UL (ref 0–0.5)
EOSINOPHILS %: 3.7 % (ref 0–7)
HEMATOCRIT: 30.9 % (ref 37–47)
HEMOGLOBIN: 9.8 G/DL (ref 12–16)
IMMATURE GRANS #M: 0.1 10^3/UL (ref 0–0.03)
IMMATURE GRANS % (M): 0.9 % (ref 0–0.43)
LYMPHOCYTES #: 2.4 10^3/UL (ref 0.8–2.9)
LYMPHOCYTES %: 21.5 % (ref 15–51)
MEAN CORPUSCULAR HEMOGLOBIN: 29.3 PG (ref 29–33)
MEAN CORPUSCULAR HGB CONC: 31.7 G/DL (ref 32–37)
MEAN CORPUSCULAR VOLUME: 92.5 FL (ref 82–101)
MEAN PLATELET VOLUME: 9.1 FL (ref 7.4–10.4)
MONOCYTE #: 0.8 10^3/UL (ref 0.3–0.9)
MONOCYTES %: 7.5 % (ref 0–11)
NEUTROPHIL #: 7.3 10^3/UL (ref 1.6–7.5)
NEUTROPHILS %: 66 % (ref 39–77)
NUCLEATED RED BLOOD CELLS #: 0 10^3/UL (ref 0–0)
NUCLEATED RED BLOOD CELLS%: 0 /100WBC (ref 0–0)
PLATELET COUNT: 394 10^3/UL (ref 140–415)
RED BLOOD COUNT: 3.34 10^6/UL (ref 4.2–5.4)
RED CELL DISTRIBUTION WIDTH: 13.8 % (ref 11.5–14.5)
WHITE BLOOD COUNT: 11 10^3/UL (ref 4.8–10.8)

## 2018-09-27 RX ADMIN — INSULIN ASPART 1 UNIT: 100 INJECTION, SOLUTION INTRAVENOUS; SUBCUTANEOUS at 11:41

## 2018-09-27 RX ADMIN — INSULIN ASPART 1 UNIT: 100 INJECTION, SOLUTION INTRAVENOUS; SUBCUTANEOUS at 07:44

## 2018-09-27 RX ADMIN — ENOXAPARIN SODIUM 1 MG: 100 INJECTION SUBCUTANEOUS at 08:36

## 2018-09-27 RX ADMIN — CLOPIDOGREL 1 MG: 75 TABLET, FILM COATED ORAL at 08:28

## 2018-09-27 RX ADMIN — ASPIRIN 1 MG: 81 TABLET, COATED ORAL at 08:28

## 2018-09-27 RX ADMIN — SENNOSIDES 1 TAB: 8.6 TABLET, FILM COATED ORAL at 08:28

## 2018-09-27 RX ADMIN — OXYCODONE HYDROCHLORIDE AND ACETAMINOPHEN 1 TAB: 10; 325 TABLET ORAL at 19:32

## 2018-09-27 RX ADMIN — OXYCODONE HYDROCHLORIDE AND ACETAMINOPHEN 1 TAB: 10; 325 TABLET ORAL at 07:56

## 2018-09-27 RX ADMIN — INSULIN ASPART 1 UNIT: 100 INJECTION, SOLUTION INTRAVENOUS; SUBCUTANEOUS at 07:50

## 2018-09-27 RX ADMIN — INSULIN GLARGINE 1 UNITS: 100 INJECTION, SOLUTION SUBCUTANEOUS at 20:41

## 2018-09-27 RX ADMIN — INSULIN ASPART 1 UNIT: 100 INJECTION, SOLUTION INTRAVENOUS; SUBCUTANEOUS at 20:41

## 2018-09-27 RX ADMIN — FUROSEMIDE 1 MG: 10 INJECTION, SOLUTION INTRAVENOUS at 15:45

## 2018-09-27 RX ADMIN — INSULIN ASPART 1 UNIT: 100 INJECTION, SOLUTION INTRAVENOUS; SUBCUTANEOUS at 17:24

## 2018-09-27 RX ADMIN — INSULIN ASPART 1 UNIT: 100 INJECTION, SOLUTION INTRAVENOUS; SUBCUTANEOUS at 11:44

## 2018-09-27 RX ADMIN — INSULIN ASPART 1 UNIT: 100 INJECTION, SOLUTION INTRAVENOUS; SUBCUTANEOUS at 17:28

## 2018-09-27 RX ADMIN — OXYCODONE HYDROCHLORIDE AND ACETAMINOPHEN 1 TAB: 10; 325 TABLET ORAL at 15:19

## 2018-09-27 RX ADMIN — SENNOSIDES 1 TAB: 8.6 TABLET, FILM COATED ORAL at 20:28

## 2018-09-27 RX ADMIN — LISINOPRIL 1 MG: 5 TABLET ORAL at 08:29

## 2018-09-27 RX ADMIN — ATORVASTATIN CALCIUM 1 MG: 80 TABLET, FILM COATED ORAL at 20:27

## 2018-09-27 RX ADMIN — FAMOTIDINE 1 MG: 20 TABLET ORAL at 08:28

## 2018-09-28 LAB
ADD MAN DIFF?: NO
ANION GAP: 11 (ref 8–16)
BASOPHIL #: 0 10^3/UL (ref 0–0.1)
BASOPHILS %: 0.3 % (ref 0–2)
BLOOD UREA NITROGEN: 31 MG/DL (ref 7–20)
CALCIUM: 8.4 MG/DL (ref 8.4–10.2)
CARBON DIOXIDE: 31 MMOL/L (ref 21–31)
CHLORIDE: 101 MMOL/L (ref 97–110)
CREATININE: 0.95 MG/DL (ref 0.44–1)
EOSINOPHILS #: 0.4 10^3/UL (ref 0–0.5)
EOSINOPHILS %: 3.8 % (ref 0–7)
GLUCOSE: 61 MG/DL (ref 70–220)
HEMATOCRIT: 29 % (ref 37–47)
HEMOGLOBIN: 9 G/DL (ref 12–16)
IMMATURE GRANS #M: 0.08 10^3/UL (ref 0–0.03)
IMMATURE GRANS % (M): 0.7 % (ref 0–0.43)
LYMPHOCYTES #: 2.1 10^3/UL (ref 0.8–2.9)
LYMPHOCYTES %: 18.9 % (ref 15–51)
MAGNESIUM: 1.5 MG/DL (ref 1.7–2.5)
MEAN CORPUSCULAR HEMOGLOBIN: 28.8 PG (ref 29–33)
MEAN CORPUSCULAR HGB CONC: 31 G/DL (ref 32–37)
MEAN CORPUSCULAR VOLUME: 92.9 FL (ref 82–101)
MEAN PLATELET VOLUME: 9 FL (ref 7.4–10.4)
MONOCYTE #: 0.9 10^3/UL (ref 0.3–0.9)
MONOCYTES %: 8.2 % (ref 0–11)
NEUTROPHIL #: 7.4 10^3/UL (ref 1.6–7.5)
NEUTROPHILS %: 68.1 % (ref 39–77)
NUCLEATED RED BLOOD CELLS #: 0 10^3/UL (ref 0–0)
NUCLEATED RED BLOOD CELLS%: 0 /100WBC (ref 0–0)
PHOSPHORUS: 4.8 MG/DL (ref 2.5–4.9)
PLATELET COUNT: 434 10^3/UL (ref 140–415)
POTASSIUM: 4 MMOL/L (ref 3.5–5.1)
RED BLOOD COUNT: 3.12 10^6/UL (ref 4.2–5.4)
RED CELL DISTRIBUTION WIDTH: 14.1 % (ref 11.5–14.5)
SODIUM: 139 MMOL/L (ref 135–144)
WHITE BLOOD COUNT: 10.9 10^3/UL (ref 4.8–10.8)

## 2018-09-28 RX ADMIN — MAGNESIUM SULFATE HEPTAHYDRATE 1 MLS/HR: 40 INJECTION, SOLUTION INTRAVENOUS at 09:51

## 2018-09-28 RX ADMIN — INSULIN ASPART 1 UNIT: 100 INJECTION, SOLUTION INTRAVENOUS; SUBCUTANEOUS at 11:38

## 2018-09-28 RX ADMIN — INSULIN ASPART 1 UNIT: 100 INJECTION, SOLUTION INTRAVENOUS; SUBCUTANEOUS at 11:40

## 2018-09-28 RX ADMIN — ASPIRIN 1 MG: 81 TABLET, COATED ORAL at 08:43

## 2018-09-28 RX ADMIN — FAMOTIDINE 1 MG: 20 TABLET ORAL at 08:43

## 2018-09-28 RX ADMIN — OXYCODONE HYDROCHLORIDE AND ACETAMINOPHEN 1 TAB: 10; 325 TABLET ORAL at 00:18

## 2018-09-28 RX ADMIN — ENOXAPARIN SODIUM 1 MG: 100 INJECTION SUBCUTANEOUS at 08:49

## 2018-09-28 RX ADMIN — OXYCODONE HYDROCHLORIDE AND ACETAMINOPHEN 1 TAB: 10; 325 TABLET ORAL at 05:58

## 2018-09-28 RX ADMIN — SENNOSIDES 1 TAB: 8.6 TABLET, FILM COATED ORAL at 08:43

## 2018-09-28 RX ADMIN — LISINOPRIL 1 MG: 5 TABLET ORAL at 08:43

## 2018-09-28 RX ADMIN — INSULIN ASPART 1 UNIT: 100 INJECTION, SOLUTION INTRAVENOUS; SUBCUTANEOUS at 08:00

## 2018-09-28 RX ADMIN — OXYCODONE HYDROCHLORIDE AND ACETAMINOPHEN 1 TAB: 10; 325 TABLET ORAL at 11:41

## 2018-09-28 RX ADMIN — CLOPIDOGREL 1 MG: 75 TABLET, FILM COATED ORAL at 08:43

## 2018-09-28 RX ADMIN — SODIUM PHOSPHATE, DIBASIC AND SODIUM PHOSPHATE, MONOBASIC 1 ML: 7; 19 ENEMA RECTAL at 09:51

## 2018-10-06 ENCOUNTER — HOSPITAL ENCOUNTER (INPATIENT)
Dept: HOSPITAL 54 - ER | Age: 68
LOS: 1 days | Discharge: HOME | DRG: 205 | End: 2018-10-07
Attending: NURSE PRACTITIONER | Admitting: INTERNAL MEDICINE
Payer: COMMERCIAL

## 2018-10-06 VITALS — HEIGHT: 64 IN | BODY MASS INDEX: 26.46 KG/M2 | WEIGHT: 155 LBS

## 2018-10-06 DIAGNOSIS — E78.5: ICD-10-CM

## 2018-10-06 DIAGNOSIS — Z95.5: ICD-10-CM

## 2018-10-06 DIAGNOSIS — E03.9: ICD-10-CM

## 2018-10-06 DIAGNOSIS — Z79.84: ICD-10-CM

## 2018-10-06 DIAGNOSIS — C50.912: ICD-10-CM

## 2018-10-06 DIAGNOSIS — D63.8: ICD-10-CM

## 2018-10-06 DIAGNOSIS — E66.9: ICD-10-CM

## 2018-10-06 DIAGNOSIS — Z79.82: ICD-10-CM

## 2018-10-06 DIAGNOSIS — Z95.1: ICD-10-CM

## 2018-10-06 DIAGNOSIS — M94.0: Primary | ICD-10-CM

## 2018-10-06 DIAGNOSIS — I13.0: ICD-10-CM

## 2018-10-06 DIAGNOSIS — F41.9: ICD-10-CM

## 2018-10-06 DIAGNOSIS — I44.30: ICD-10-CM

## 2018-10-06 DIAGNOSIS — E11.22: ICD-10-CM

## 2018-10-06 DIAGNOSIS — Z79.899: ICD-10-CM

## 2018-10-06 DIAGNOSIS — N18.9: ICD-10-CM

## 2018-10-06 DIAGNOSIS — I25.10: ICD-10-CM

## 2018-10-06 DIAGNOSIS — Z95.810: ICD-10-CM

## 2018-10-06 DIAGNOSIS — Z79.4: ICD-10-CM

## 2018-10-06 DIAGNOSIS — I50.9: ICD-10-CM

## 2018-10-06 DIAGNOSIS — J98.11: ICD-10-CM

## 2018-10-06 DIAGNOSIS — N17.0: ICD-10-CM

## 2018-10-06 PROCEDURE — Z7610: HCPCS

## 2018-10-06 PROCEDURE — A4606 OXYGEN PROBE USED W OXIMETER: HCPCS

## 2018-10-07 VITALS — SYSTOLIC BLOOD PRESSURE: 114 MMHG | DIASTOLIC BLOOD PRESSURE: 66 MMHG

## 2018-10-07 VITALS — DIASTOLIC BLOOD PRESSURE: 69 MMHG | SYSTOLIC BLOOD PRESSURE: 114 MMHG

## 2018-10-07 VITALS — DIASTOLIC BLOOD PRESSURE: 61 MMHG | SYSTOLIC BLOOD PRESSURE: 117 MMHG

## 2018-10-07 LAB
APTT PPP: 27 SEC (ref 23–34)
BASOPHILS # BLD AUTO: 0.1 /CMM (ref 0–0.2)
BASOPHILS NFR BLD AUTO: 1.2 % (ref 0–2)
BUN SERPL-MCNC: 36 MG/DL (ref 7–18)
CALCIUM SERPL-MCNC: 8.5 MG/DL (ref 8.5–10.1)
CHLORIDE SERPL-SCNC: 101 MMOL/L (ref 98–107)
CO2 SERPL-SCNC: 25 MMOL/L (ref 21–32)
CREAT SERPL-MCNC: 1.6 MG/DL (ref 0.6–1.3)
EOSINOPHIL NFR BLD AUTO: 4.9 % (ref 0–6)
GLUCOSE SERPL-MCNC: 121 MG/DL (ref 74–106)
HCT VFR BLD AUTO: 28 % (ref 33–45)
HGB BLD-MCNC: 8.6 G/DL (ref 11.5–14.8)
INR PPP: 1.04 (ref 0.87–1.13)
LYMPHOCYTES NFR BLD AUTO: 1.7 /CMM (ref 0.8–4.8)
LYMPHOCYTES NFR BLD AUTO: 21.8 % (ref 20–44)
MCHC RBC AUTO-ENTMCNC: 31 G/DL (ref 31–36)
MCV RBC AUTO: 91 FL (ref 82–100)
MONOCYTES NFR BLD AUTO: 0.5 /CMM (ref 0.1–1.3)
MONOCYTES NFR BLD AUTO: 6.2 % (ref 2–12)
NEUTROPHILS # BLD AUTO: 5.2 /CMM (ref 1.8–8.9)
NEUTROPHILS NFR BLD AUTO: 65.9 % (ref 43–81)
PLATELET # BLD AUTO: 575 /CMM (ref 150–450)
POTASSIUM SERPL-SCNC: 5.1 MMOL/L (ref 3.5–5.1)
RBC # BLD AUTO: 3.03 MIL/UL (ref 4–5.2)
RDW COEFFICIENT OF VARIATION: 15.5 (ref 11.5–15)
SODIUM SERPL-SCNC: 138 MMOL/L (ref 136–145)
TROPONIN I SERPL-MCNC: 0.03 NG/ML (ref 0–0.06)
WBC NRBC COR # BLD AUTO: 7.9 K/UL (ref 4.3–11)

## 2018-10-07 RX ADMIN — Medication PRN MG: at 08:01

## 2018-10-07 RX ADMIN — Medication SCH EACH: at 07:57

## 2018-10-07 RX ADMIN — Medication SCH EACH: at 12:33

## 2018-10-07 RX ADMIN — ALBUTEROL SULFATE SCH MG: 2.5 SOLUTION RESPIRATORY (INHALATION) at 11:20

## 2018-10-07 RX ADMIN — ALBUTEROL SULFATE SCH MG: 2.5 SOLUTION RESPIRATORY (INHALATION) at 07:06

## 2018-10-07 RX ADMIN — Medication PRN MG: at 13:14

## 2018-10-07 NOTE — NUR
PT BIBSELF COMPLAINING OF SOB X 1HR. PT RECENTLY HAD AAA SURGERY AT [facility] 
ON 09/28/18 BY DR. LEONARDO. PT IS AAOX4. RESPIRATIONS SHALLOW, 99% ON ROOM AIR. NO 
ACUTE DISTRESS NOTED AT THIS TIME. PT AMBULATORY TO ER BED. PLACED IN GOWN AND 
ON MONITOR.

## 2018-10-07 NOTE — NUR
TELE/RN NOTES

NEW ADMITTED PATIENT IS A 67 YO FEMALE WHO WAS BROUGHT TO HOSPITAL VIA AMBULANCE DUE TO SOB 
EPISODE, WITH DX OF ACUTE RESPIRATORY DISORDER, PATIETN IS ALERT, ORIENTED X4, ABLE TO 
VERBALIZE NEEDS, NO PAIN VERBALIZED AND OBSERVED, MD GASTELUM IS THE ADMITTING DR, PATIENT HAS 
BEEN HOSPITALISED RECENTLY AND HAD AAA SX LAST 9/28/18, W/ HX OF HTN, DM , HAS LEFT SIDE 
CHEST PACEMAKER AND AICD, PREVIOUS HX OF CABG, STENT, ON DVT PPX, RIGHT AC GAUGE 20 PATENT, 
FAMILY INVOLVE, ROOM ORIENTATION PROVIDED, CALL LIGHTS WITHIN REACH, BED IN LOCK POSITION, 
PROVIDED PLAN OF CARE. WILL MONITOR.

## 2018-10-07 NOTE — NUR
rn notes

 administered morphine 2 mg/ml iv push fo mid chest  pain 7/10 per patient request  v/s 
taken bp 104/ 76, p-78, bs -276 mg/dl coverage given, continued monitoring.

## 2018-10-07 NOTE — NUR
TELE/RN NOTES

PATIENT ALERT, AWAKE, X3, ABLE TO VERBALIZE NEEDS, TELE READING AT SR 83, RESPIRATIONS EVEN 
AND UNLABORED, BLOOD SUGAR , DISCUSSED GOAL AND SAID FEELS BETTER, BED IN LOCK 
POSITION CALL LIGHTS WITHIN REACH, WILL MONITOR.

## 2018-10-07 NOTE — NUR
DISCHARGE NOTES

 PATIENT DISCHARGE AT THIS TIME GOING HOME.  PATIENT STABLE,NO ACUTE RESPIRATORY DISTRESS,  
NO COMPLAINING OF PAIN AT THIS TIME. MED RECONCILIATION AND DISCHARGE ORDER REVIEWED AND 
EXPLAINED TO PATIENT. PATIENT VERBALIZED UNDERSTANDING. PATIENT MED COMPLIANT,V/S STABLE.  
PATIENT SIGN PAPERWORK.  PATIENT WILL FOLLOW PRIMARY MD. ESCORTED PATIENT TO THE LOBBY FOR 
SAFETY. PATIENT  BY  NAME IFEOMA PHONE # [phone].

## 2018-10-07 NOTE — NUR
RN NOTES

 RECEIVED PATIENT IN THE ROOM A/O X3, PATIENT ON TELE SR-88, NO ACUTE RESPIRATORY DISTRESS, 
V/S TAKEN STABLE, SCHEDULED MEDICATION ADMINISTERED. PATIENT COMPLAINING OF PAIN ON MID 
CHEST SURGERY AREA 8/10 PER PATIENT REQUEST   ADMINISTERED MORPHINE 2MG/ML IV PUSH, AND 
ZOFRAN 4 MG/ML IV PUSH  FOR NAUSEA, PATIENT AMBULATORY, NEEDS ATTENDED AND ANTICIPATED,  
PATIENT HAS IV ON RIGHT AC AREA INFUSING  ML/HR INTACT. CALL LIGHT WITHIN TO REACH, 
CONTINUED MONITORING.

## 2020-03-22 ENCOUNTER — HOSPITAL ENCOUNTER (INPATIENT)
Dept: HOSPITAL 54 - ER | Age: 70
LOS: 3 days | Discharge: HOME | DRG: 689 | End: 2020-03-25
Attending: INTERNAL MEDICINE | Admitting: INTERNAL MEDICINE
Payer: COMMERCIAL

## 2020-03-22 VITALS — DIASTOLIC BLOOD PRESSURE: 52 MMHG | SYSTOLIC BLOOD PRESSURE: 110 MMHG

## 2020-03-22 VITALS — WEIGHT: 147 LBS | HEIGHT: 64 IN | BODY MASS INDEX: 25.1 KG/M2

## 2020-03-22 VITALS — SYSTOLIC BLOOD PRESSURE: 110 MMHG | DIASTOLIC BLOOD PRESSURE: 52 MMHG

## 2020-03-22 DIAGNOSIS — Z95.1: ICD-10-CM

## 2020-03-22 DIAGNOSIS — E03.9: ICD-10-CM

## 2020-03-22 DIAGNOSIS — E11.9: ICD-10-CM

## 2020-03-22 DIAGNOSIS — I25.10: ICD-10-CM

## 2020-03-22 DIAGNOSIS — E86.0: ICD-10-CM

## 2020-03-22 DIAGNOSIS — I10: ICD-10-CM

## 2020-03-22 DIAGNOSIS — N17.0: ICD-10-CM

## 2020-03-22 DIAGNOSIS — E87.5: ICD-10-CM

## 2020-03-22 DIAGNOSIS — I25.2: ICD-10-CM

## 2020-03-22 DIAGNOSIS — Z95.810: ICD-10-CM

## 2020-03-22 DIAGNOSIS — N39.0: Primary | ICD-10-CM

## 2020-03-22 DIAGNOSIS — E87.2: ICD-10-CM

## 2020-03-22 DIAGNOSIS — R33.9: ICD-10-CM

## 2020-03-22 DIAGNOSIS — E78.5: ICD-10-CM

## 2020-03-22 DIAGNOSIS — F41.9: ICD-10-CM

## 2020-03-22 LAB
ALBUMIN SERPL BCP-MCNC: 4.3 G/DL (ref 3.4–5)
ALP SERPL-CCNC: 96 U/L (ref 46–116)
ALT SERPL W P-5'-P-CCNC: 33 U/L (ref 12–78)
AST SERPL W P-5'-P-CCNC: 36 U/L (ref 15–37)
BASE EXCESS BLDA CALC-SCNC: -16 MMOL/L
BASOPHILS # BLD AUTO: 0.1 /CMM (ref 0–0.2)
BASOPHILS NFR BLD AUTO: 0.6 % (ref 0–2)
BILIRUB SERPL-MCNC: 0.3 MG/DL (ref 0.2–1)
BUN SERPL-MCNC: 109 MG/DL (ref 7–18)
CALCIUM SERPL-MCNC: 9 MG/DL (ref 8.5–10.1)
CHLORIDE SERPL-SCNC: 101 MMOL/L (ref 98–107)
CO2 SERPL-SCNC: 15 MMOL/L (ref 21–32)
CREAT SERPL-MCNC: 4.5 MG/DL (ref 0.6–1.3)
DO-HGB MFR BLDA: 23.7 MMHG
EOSINOPHIL NFR BLD AUTO: 0.5 % (ref 0–6)
GLUCOSE SERPL-MCNC: 109 MG/DL (ref 74–106)
HCT VFR BLD AUTO: 37 % (ref 33–45)
HGB BLD-MCNC: 11.6 G/DL (ref 11.5–14.8)
INHALED O2 CONCENTRATION: 21 %
LYMPHOCYTES NFR BLD AUTO: 1.4 /CMM (ref 0.8–4.8)
LYMPHOCYTES NFR BLD AUTO: 12.1 % (ref 20–44)
MCHC RBC AUTO-ENTMCNC: 31 G/DL (ref 31–36)
MCV RBC AUTO: 94 FL (ref 82–100)
MONOCYTES NFR BLD AUTO: 0.4 /CMM (ref 0.1–1.3)
MONOCYTES NFR BLD AUTO: 3.5 % (ref 2–12)
NEUTROPHILS # BLD AUTO: 9.7 /CMM (ref 1.8–8.9)
NEUTROPHILS NFR BLD AUTO: 83.3 % (ref 43–81)
PCO2 TEMP ADJ BLDA: 28.3 MMHG (ref 35–45)
PH TEMP ADJ BLDA: 7.19 [PH] (ref 7.35–7.45)
PH UR STRIP: 5 [PH] (ref 5–8)
PLATELET # BLD AUTO: 322 /CMM (ref 150–450)
PO2 TEMP ADJ BLDA: 92.1 MMHG (ref 75–100)
POTASSIUM SERPL-SCNC: 5.9 MMOL/L (ref 3.5–5.1)
PROT SERPL-MCNC: 8.4 G/DL (ref 6.4–8.2)
RBC # BLD AUTO: 3.94 MIL/UL (ref 4–5.2)
RBC #/AREA URNS HPF: (no result) /HPF (ref 0–2)
SAO2 % BLDA: 95.5 % (ref 92–98.5)
SODIUM SERPL-SCNC: 134 MMOL/L (ref 136–145)
UROBILINOGEN UR STRIP-MCNC: 0.2 EU/DL
VENTILATION MODE VENT: (no result)
WBC #/AREA URNS HPF: (no result) /HPF
WBC NRBC COR # BLD AUTO: 11.6 K/UL (ref 4.3–11)

## 2020-03-22 PROCEDURE — G0378 HOSPITAL OBSERVATION PER HR: HCPCS

## 2020-03-22 RX ADMIN — ACETAMINOPHEN PRN MG: 325 TABLET ORAL at 23:54

## 2020-03-22 NOTE — NUR
TELE RN NOTES

PATIENT C/O OF HEADACHE 3/10. PRN TYLENOL 325MG 1 TAB PO WAS GIVEN WILL CONT TO MONITOR AND 
REASSESS FOR EFFECTIVENESS.

## 2020-03-22 NOTE — NUR
BIB SELF C/O GEN WEAKNESS STARTED YESTERDAY AND LOW BP 87/50, TO ER BED 11, 
HOOKED TO MONITOR, CHANGED TO HOSP GOWN, WARM BLANKET PROVIDED, KEREN RAYMUNDO AT 
BEDSIDE

## 2020-03-22 NOTE — NUR
-------------------------------------------------------------------------------

             *** Note undone in ED - 03/23/20 at 0004 by SYLVIA ***             

-------------------------------------------------------------------------------

PATIENT GIVEN SODIUM BICARBONATE 100MEQ IVP GIVEN SLOWLY OVER 10 MINUTES.

## 2020-03-22 NOTE — NUR
TELE RN ADMISSION NOTES

PATIENT TRANSFERRED FROM ER VIA [facility] IN STABLE CONDITION. PATIENT IS AOX4. BREATHING 
NORMAL ON RA O2 98%, NO SOB NOTED, RESPIRATION EVEN NON LABORED. NO S/S OF ACUTE DISTRESS 
NOTED. ABLE TO AMBULATE TO BED WITH ASSIST. TELE MONITOR READING SINUS RHYTHM, HEART RATE 
70. IV SITE ON RIGHT AC, SIZE 18, INTACT & PATENT. SKIN ASSESSMENT DONE NOTED WITH SACRAL 
REDNESS, PICTURE TAKEN AND PLACED IN THE CHART. OVERALL SKIN INTACT AND DRY. BELONGINGS LIST 
VERIFIED AND PLACED IN CHART. ADMISSION ORDERS ACKNOWLEDGED. ALL NEEDS ATTENDED. SAFETY 
MEASURES IN PLACE. CALL LIGHT WITHIN REACH. BED IN LOW AND LOCKED POSITION. WILL ENDORSE 
PATIENT TO MORNING NURSE FOR CONTINUES CARE.

## 2020-03-23 VITALS — DIASTOLIC BLOOD PRESSURE: 72 MMHG | SYSTOLIC BLOOD PRESSURE: 121 MMHG

## 2020-03-23 VITALS — SYSTOLIC BLOOD PRESSURE: 119 MMHG | DIASTOLIC BLOOD PRESSURE: 49 MMHG

## 2020-03-23 VITALS — SYSTOLIC BLOOD PRESSURE: 117 MMHG | DIASTOLIC BLOOD PRESSURE: 60 MMHG

## 2020-03-23 VITALS — DIASTOLIC BLOOD PRESSURE: 58 MMHG | SYSTOLIC BLOOD PRESSURE: 127 MMHG

## 2020-03-23 VITALS — DIASTOLIC BLOOD PRESSURE: 66 MMHG | SYSTOLIC BLOOD PRESSURE: 119 MMHG

## 2020-03-23 LAB
BASOPHILS # BLD AUTO: 0 /CMM (ref 0–0.2)
BASOPHILS NFR BLD AUTO: 0.3 % (ref 0–2)
BUN SERPL-MCNC: 91 MG/DL (ref 7–18)
CALCIUM SERPL-MCNC: 7.8 MG/DL (ref 8.5–10.1)
CHLORIDE SERPL-SCNC: 102 MMOL/L (ref 98–107)
CO2 SERPL-SCNC: 20 MMOL/L (ref 21–32)
CREAT SERPL-MCNC: 2.9 MG/DL (ref 0.6–1.3)
EOSINOPHIL NFR BLD AUTO: 0.9 % (ref 0–6)
GLUCOSE SERPL-MCNC: 223 MG/DL (ref 74–106)
HCT VFR BLD AUTO: 30 % (ref 33–45)
HGB BLD-MCNC: 10.3 G/DL (ref 11.5–14.8)
LYMPHOCYTES NFR BLD AUTO: 1.5 /CMM (ref 0.8–4.8)
LYMPHOCYTES NFR BLD AUTO: 23.6 % (ref 20–44)
MCHC RBC AUTO-ENTMCNC: 34 G/DL (ref 31–36)
MCV RBC AUTO: 92 FL (ref 82–100)
MONOCYTES NFR BLD AUTO: 0.3 /CMM (ref 0.1–1.3)
MONOCYTES NFR BLD AUTO: 5 % (ref 2–12)
NEUTROPHILS # BLD AUTO: 4.5 /CMM (ref 1.8–8.9)
NEUTROPHILS NFR BLD AUTO: 70.2 % (ref 43–81)
PLATELET # BLD AUTO: 240 /CMM (ref 150–450)
POTASSIUM SERPL-SCNC: 3.9 MMOL/L (ref 3.5–5.1)
RBC # BLD AUTO: 3.3 MIL/UL (ref 4–5.2)
SODIUM SERPL-SCNC: 146 MMOL/L (ref 136–145)
WBC NRBC COR # BLD AUTO: 6.4 K/UL (ref 4.3–11)

## 2020-03-23 RX ADMIN — Medication PRN EACH: at 23:45

## 2020-03-23 RX ADMIN — Medication SCH EACH: at 21:31

## 2020-03-23 RX ADMIN — Medication SCH EACH: at 07:30

## 2020-03-23 RX ADMIN — LEVOTHYROXINE SODIUM SCH MCG: 88 TABLET ORAL at 08:09

## 2020-03-23 RX ADMIN — Medication SCH TAB: at 09:05

## 2020-03-23 RX ADMIN — INSULIN HUMAN PRN UNIT: 100 INJECTION, SOLUTION PARENTERAL at 09:04

## 2020-03-23 RX ADMIN — GABAPENTIN SCH MG: 400 CAPSULE ORAL at 17:12

## 2020-03-23 RX ADMIN — INSULIN GLARGINE SCH UNIT: 100 INJECTION, SOLUTION SUBCUTANEOUS at 21:28

## 2020-03-23 RX ADMIN — INSULIN GLARGINE SCH UNIT: 100 INJECTION, SOLUTION SUBCUTANEOUS at 09:34

## 2020-03-23 RX ADMIN — Medication SCH MG: at 09:05

## 2020-03-23 RX ADMIN — PANTOPRAZOLE SODIUM SCH MG: 40 TABLET, DELAYED RELEASE ORAL at 08:09

## 2020-03-23 RX ADMIN — GABAPENTIN SCH MG: 400 CAPSULE ORAL at 12:59

## 2020-03-23 RX ADMIN — Medication SCH EACH: at 12:52

## 2020-03-23 RX ADMIN — INSULIN HUMAN PRN UNIT: 100 INJECTION, SOLUTION PARENTERAL at 17:38

## 2020-03-23 RX ADMIN — ATORVASTATIN CALCIUM SCH MG: 10 TABLET, FILM COATED ORAL at 17:38

## 2020-03-23 RX ADMIN — GABAPENTIN SCH MG: 400 CAPSULE ORAL at 09:05

## 2020-03-23 RX ADMIN — Medication PRN EACH: at 01:53

## 2020-03-23 RX ADMIN — INSULIN HUMAN PRN UNIT: 100 INJECTION, SOLUTION PARENTERAL at 12:57

## 2020-03-23 RX ADMIN — Medication SCH EACH: at 17:35

## 2020-03-23 RX ADMIN — DEXTROSE MONOHYDRATE SCH MLS/HR: 50 INJECTION, SOLUTION INTRAVENOUS at 20:28

## 2020-03-23 NOTE — NUR
TELE RN NOTES

PATIENT RESTING COMFORTABLY. AOX4. DENIES ANY PAIN OR DISCOMFORT AT THIS TIME. DUE 
MEDICATIONS WERE GIVEN TOLERATED WELL. ON TELE MONITORING SR IN 70. PATIENT IS COOPERATIVE 
TO CARE. RAC IV SITE 18G INTACT AND PATENT. SAFETY MEASURES IN PLACE. CALL LIGHT WITHIN 
REACH. BED IN LOW AND LOCKED POSITION. WILL ENDORSE PATIENT TO MORNING NURSE FOR CONTINUES 
CARE.

## 2020-03-23 NOTE — NUR
TELE RN NOTE

PATIENT ACCIDENTLY PULLED HER IV ON RAC. NO S/S OF BLEEDING NO SWELLING NOTED. NEW LINE 
STARTED ON LAC 22G INTACT FLUSHING WELL.

## 2020-03-23 NOTE — NUR
TELE RN NOTES

FOR HEADACHE 7/10, NORCO 5/325MG PO GIVEN AS ORDERED. WILL CONT TO MONITOR FOR 
EFFECTIVENESS. ALL SAFETY MEASURES IN PLACE. CALL LIGHT WITHIN REACH.

## 2020-03-23 NOTE — NUR
TELE RN NOTES

FOR HEADACHE 7/10, NORCO 5/325MG PO 1 TAB GIVEN AS ORDERED. WILL CONT TO MONITOR FOR 
EFFECTIVENESS. ALL SAFETY MEASURES IN PLACE. CALL LIGHT WITHIN REACH.

## 2020-03-23 NOTE — NUR
TELE RN OPENING NOTES

RECEIVED PATIENT AWAKE IN BED CALM AND RELAXED. PATIENT  DENIES ANY PAIN OR DISCOMFORT. 
BREATHING NORMAL NO SOB NOTED. SKIN WARM AND DRY TO TOUCH. RAC IV INTACT FLUSHED WELL, 
PATENT. FC DARNING WELL. VITAL SIGNS WNL. SAFETY MEASURES IN PLACE, BED IN LOW AND LOCKED 
POSITION. CALL LIGHT WITHIN REACH. WILL CONT TO MONITOR.

## 2020-03-23 NOTE — NUR
BEATRIZ CLOSING RN NOTE

PT IS IN BED AWAKE. APPEARS CALM AND RELAXED. PT IS AOX4. NO CO PAIN OR DISCOMFORT. ALL DUE 
MEDS GIVEN. ALL NEEDS MET. BERG CATHETER IN PLACE AND PATENT. SAFETY MEASURES IMPLEMENTED. 
CALL LIGHT WITHIN REACH. BED ON LOCKED AND LOWEST POSITION. ENDORSED TO NIGHT SHIFT FOR EDDIE.

## 2020-03-23 NOTE — NUR
RN OPENING NOTE

Received pt awake in bed. Appears calm and relaxed. Pt is very nice and pleasant. No co pain 
or discomfort. Pt has a RAC IV line. Flushed with saline, patent. Green catheter in place 
draining clear yellow urine. Will monitor output accordingly. No co pain or discomfort. Pt 
is ambulatory. Safety measure implemented. Call light within reach. Bed on lowest and locked 
position. Will cont to monitor.

## 2020-03-23 NOTE — NUR
TELE RN NOTES

RECEIVED NEW ORDER FROM DR BUDDY ROTHMAN 5/325MG 1 TAB PO Q4 PRN AND KAYEXALATE 30GM PO X1. 
ORDER READ BACK. ORDER NOTED AND CARRIED OUT. PATIENT AWARE.

## 2020-03-24 VITALS — SYSTOLIC BLOOD PRESSURE: 168 MMHG | DIASTOLIC BLOOD PRESSURE: 82 MMHG

## 2020-03-24 VITALS — SYSTOLIC BLOOD PRESSURE: 156 MMHG | DIASTOLIC BLOOD PRESSURE: 81 MMHG

## 2020-03-24 VITALS — SYSTOLIC BLOOD PRESSURE: 137 MMHG | DIASTOLIC BLOOD PRESSURE: 92 MMHG

## 2020-03-24 VITALS — DIASTOLIC BLOOD PRESSURE: 80 MMHG | SYSTOLIC BLOOD PRESSURE: 158 MMHG

## 2020-03-24 VITALS — DIASTOLIC BLOOD PRESSURE: 77 MMHG | SYSTOLIC BLOOD PRESSURE: 134 MMHG

## 2020-03-24 VITALS — DIASTOLIC BLOOD PRESSURE: 78 MMHG | SYSTOLIC BLOOD PRESSURE: 145 MMHG

## 2020-03-24 LAB
BASOPHILS # BLD AUTO: 0 /CMM (ref 0–0.2)
BASOPHILS NFR BLD AUTO: 0.4 % (ref 0–2)
BUN SERPL-MCNC: 53 MG/DL (ref 7–18)
CALCIUM SERPL-MCNC: 8.8 MG/DL (ref 8.5–10.1)
CHLORIDE SERPL-SCNC: 106 MMOL/L (ref 98–107)
CO2 SERPL-SCNC: 23 MMOL/L (ref 21–32)
CREAT SERPL-MCNC: 1.2 MG/DL (ref 0.6–1.3)
EOSINOPHIL NFR BLD AUTO: 1.7 % (ref 0–6)
GLUCOSE SERPL-MCNC: 269 MG/DL (ref 74–106)
HCT VFR BLD AUTO: 30 % (ref 33–45)
HGB BLD-MCNC: 10.1 G/DL (ref 11.5–14.8)
LYMPHOCYTES NFR BLD AUTO: 1.6 /CMM (ref 0.8–4.8)
LYMPHOCYTES NFR BLD AUTO: 29.4 % (ref 20–44)
MCHC RBC AUTO-ENTMCNC: 34 G/DL (ref 31–36)
MCV RBC AUTO: 91 FL (ref 82–100)
MONOCYTES NFR BLD AUTO: 0.3 /CMM (ref 0.1–1.3)
MONOCYTES NFR BLD AUTO: 4.7 % (ref 2–12)
NEUTROPHILS # BLD AUTO: 3.4 /CMM (ref 1.8–8.9)
NEUTROPHILS NFR BLD AUTO: 63.8 % (ref 43–81)
PLATELET # BLD AUTO: 254 /CMM (ref 150–450)
POTASSIUM SERPL-SCNC: 3.5 MMOL/L (ref 3.5–5.1)
RBC # BLD AUTO: 3.34 MIL/UL (ref 4–5.2)
SODIUM SERPL-SCNC: 142 MMOL/L (ref 136–145)
WBC NRBC COR # BLD AUTO: 5.3 K/UL (ref 4.3–11)

## 2020-03-24 RX ADMIN — DEXTROSE MONOHYDRATE SCH MLS/HR: 50 INJECTION, SOLUTION INTRAVENOUS at 21:36

## 2020-03-24 RX ADMIN — ACETAMINOPHEN PRN MG: 325 TABLET ORAL at 05:47

## 2020-03-24 RX ADMIN — INSULIN HUMAN PRN UNIT: 100 INJECTION, SOLUTION PARENTERAL at 18:11

## 2020-03-24 RX ADMIN — Medication SCH EACH: at 21:48

## 2020-03-24 RX ADMIN — LEVOTHYROXINE SODIUM SCH MCG: 88 TABLET ORAL at 08:10

## 2020-03-24 RX ADMIN — INSULIN HUMAN PRN UNIT: 100 INJECTION, SOLUTION PARENTERAL at 08:22

## 2020-03-24 RX ADMIN — GABAPENTIN SCH MG: 400 CAPSULE ORAL at 17:34

## 2020-03-24 RX ADMIN — ACETAMINOPHEN PRN MG: 325 TABLET ORAL at 21:36

## 2020-03-24 RX ADMIN — GABAPENTIN SCH MG: 400 CAPSULE ORAL at 08:20

## 2020-03-24 RX ADMIN — INSULIN HUMAN PRN UNIT: 100 INJECTION, SOLUTION PARENTERAL at 13:35

## 2020-03-24 RX ADMIN — PANTOPRAZOLE SODIUM SCH MG: 40 TABLET, DELAYED RELEASE ORAL at 08:10

## 2020-03-24 RX ADMIN — Medication SCH TAB: at 08:20

## 2020-03-24 RX ADMIN — INSULIN GLARGINE SCH UNIT: 100 INJECTION, SOLUTION SUBCUTANEOUS at 21:52

## 2020-03-24 RX ADMIN — Medication SCH EACH: at 17:32

## 2020-03-24 RX ADMIN — Medication PRN EACH: at 12:09

## 2020-03-24 RX ADMIN — INSULIN GLARGINE SCH UNIT: 100 INJECTION, SOLUTION SUBCUTANEOUS at 09:39

## 2020-03-24 RX ADMIN — GABAPENTIN SCH MG: 400 CAPSULE ORAL at 13:36

## 2020-03-24 RX ADMIN — Medication SCH EACH: at 12:14

## 2020-03-24 RX ADMIN — ATORVASTATIN CALCIUM SCH MG: 10 TABLET, FILM COATED ORAL at 17:34

## 2020-03-24 RX ADMIN — Medication SCH MG: at 08:20

## 2020-03-24 RX ADMIN — Medication SCH EACH: at 08:13

## 2020-03-24 NOTE — NUR
RN OPENING NOTES



RECEIVED PATIENT IN BED,  A/O X 4, VERBALLY RESPONSIVE AND ABLE TO MAKE NEEDS KNOWN. 
COMPLAINT OF HEADACHE BUT PER PATIENT IS TOLERABLE. IN ROOM AIR, SATURATING WELL. NO SOB 
NOTED. IV ACCESS ON L HAND #22, INTACT, PATENT AND FLUSHED WELL. NO SIGNS OF INFILTRATION. 
WITH BERG, DRAINING CLEAR COLOR URINE VIA GRAVITY WITH 1100 ML OF OUTPUT ON THE BAG. SAFETY 
MEASURES IN PLACE, BED IN LOWEST POSITION AND LOCKED, CALL LIGHT WITHIN REACH. WILL CONTINUE 
TO MONITOR

## 2020-03-24 NOTE — NUR
TELE CLOSING RN NOTES

PATIENT RESTED WELL THROUGH OUT THE NIGHT. DENIES ANY CHEST PAIN OR DISCOMFORT. BREATHING 
NORMAL NO SOB NOTED. SKIN WARM AND DRY TO TOUCH. ALL DUE MEDICATIONS WERE GIVEN TOLERATED 
WELL. DURING SHIFT PATIENT RECEIVED PRN NORCO NOTED TO BE EFFECTIVE. PATIENT IS COOPERATIVE 
AND COMPLAINT WITH CARE. ALL NEEDS ATTENDED. SAFETY MEASURES IN PLACE. CALL LIGHT WITHIN 
REACH. WILL ENDORSE TO DAY SHIFT NURSE FOR EDDIE.

## 2020-03-24 NOTE — NUR
RN OPENING NOTE

RECEIVED PATIENT IN BED,  A/O X 4, ON ROOM AIR, SATURATING WELL. NO SOB NOTED. IV ACCESS ON 
L HAND #22, INTACT, PATENT AND FLUSHED WELL. NO SIGNS OF INFILTRATION. SAFETY MEASURES IN 
PLACE, BED LOCKED AND , CALL LIGHT WITHIN REACH. WILL CONTINUE TO MONITOR PT

## 2020-03-24 NOTE — NUR
TELE RN NOTES

PER PATIENT PRN NORCO WAS EFFECTIVE HEADACHE LOWER TO 0/10. PATIENT IS RESTING COMFORTABLY. 
SAFETY MEASURES IN PLACE.

## 2020-03-24 NOTE — NUR
TELE RN NOTE

PATIENT PULL HER IV ACCIDENTLY. REINSERT IN LT HAND 22G. NO S/S OF BLEEDING NO SWELLING 
NOTED.

## 2020-03-25 VITALS — DIASTOLIC BLOOD PRESSURE: 76 MMHG | SYSTOLIC BLOOD PRESSURE: 126 MMHG

## 2020-03-25 VITALS — SYSTOLIC BLOOD PRESSURE: 154 MMHG | DIASTOLIC BLOOD PRESSURE: 83 MMHG

## 2020-03-25 VITALS — DIASTOLIC BLOOD PRESSURE: 79 MMHG | SYSTOLIC BLOOD PRESSURE: 147 MMHG

## 2020-03-25 LAB
BUN SERPL-MCNC: 28 MG/DL (ref 7–18)
CALCIUM SERPL-MCNC: 9.7 MG/DL (ref 8.5–10.1)
CHLORIDE SERPL-SCNC: 104 MMOL/L (ref 98–107)
CO2 SERPL-SCNC: 26 MMOL/L (ref 21–32)
CREAT SERPL-MCNC: 1 MG/DL (ref 0.6–1.3)
GLUCOSE SERPL-MCNC: 239 MG/DL (ref 74–106)
MAGNESIUM SERPL-MCNC: 1.6 MG/DL (ref 1.8–2.4)
POTASSIUM SERPL-SCNC: 3.4 MMOL/L (ref 3.5–5.1)
SODIUM SERPL-SCNC: 143 MMOL/L (ref 136–145)

## 2020-03-25 RX ADMIN — Medication SCH TAB: at 09:00

## 2020-03-25 RX ADMIN — ACETAMINOPHEN PRN MG: 325 TABLET ORAL at 03:35

## 2020-03-25 RX ADMIN — Medication SCH EACH: at 09:03

## 2020-03-25 RX ADMIN — INSULIN HUMAN PRN UNIT: 100 INJECTION, SOLUTION PARENTERAL at 12:56

## 2020-03-25 RX ADMIN — MAGNESIUM SULFATE IN DEXTROSE SCH MLS/HR: 10 INJECTION, SOLUTION INTRAVENOUS at 10:31

## 2020-03-25 RX ADMIN — Medication SCH EACH: at 12:55

## 2020-03-25 RX ADMIN — GABAPENTIN SCH MG: 400 CAPSULE ORAL at 09:00

## 2020-03-25 RX ADMIN — PANTOPRAZOLE SODIUM SCH MG: 40 TABLET, DELAYED RELEASE ORAL at 09:05

## 2020-03-25 RX ADMIN — GABAPENTIN SCH MG: 400 CAPSULE ORAL at 12:55

## 2020-03-25 RX ADMIN — MAGNESIUM SULFATE IN DEXTROSE SCH MLS/HR: 10 INJECTION, SOLUTION INTRAVENOUS at 09:03

## 2020-03-25 RX ADMIN — LEVOTHYROXINE SODIUM SCH MCG: 88 TABLET ORAL at 09:05

## 2020-03-25 RX ADMIN — Medication SCH MG: at 09:00

## 2020-03-25 NOTE — NUR
ms rn

received on bed, awake,alert,oriented x4,not in any form of ditress, respirations even and 
unlabored,no sob noted, lungs are clear,abdomen soft,positive bowel sounds,denies pain at 
this time,all needs attended.

## 2020-03-25 NOTE — NUR
ms rn

discharge instructions given and understood, prescription atb. given, was understood , went 
home accompamied by .

## 2020-05-01 ENCOUNTER — HOSPITAL ENCOUNTER (EMERGENCY)
Dept: HOSPITAL 54 - ER | Age: 70
Discharge: ANOTHER HOSPITAL | End: 2020-05-01
Payer: COMMERCIAL

## 2020-05-01 VITALS — DIASTOLIC BLOOD PRESSURE: 55 MMHG | SYSTOLIC BLOOD PRESSURE: 119 MMHG

## 2020-05-01 VITALS — WEIGHT: 150 LBS | BODY MASS INDEX: 25.61 KG/M2 | HEIGHT: 64 IN

## 2020-05-01 DIAGNOSIS — E11.9: ICD-10-CM

## 2020-05-01 DIAGNOSIS — N30.00: ICD-10-CM

## 2020-05-01 DIAGNOSIS — Z79.82: ICD-10-CM

## 2020-05-01 DIAGNOSIS — N13.8: ICD-10-CM

## 2020-05-01 DIAGNOSIS — I10: ICD-10-CM

## 2020-05-01 DIAGNOSIS — I45.10: ICD-10-CM

## 2020-05-01 DIAGNOSIS — Z98.890: ICD-10-CM

## 2020-05-01 DIAGNOSIS — Z79.899: ICD-10-CM

## 2020-05-01 DIAGNOSIS — N17.8: Primary | ICD-10-CM

## 2020-05-01 DIAGNOSIS — I44.4: ICD-10-CM

## 2020-05-01 DIAGNOSIS — I25.10: ICD-10-CM

## 2020-05-01 LAB
ALBUMIN SERPL BCP-MCNC: 3.7 G/DL (ref 3.4–5)
ALP SERPL-CCNC: 101 U/L (ref 46–116)
ALT SERPL W P-5'-P-CCNC: 19 U/L (ref 12–78)
APPEARANCE UR: (no result)
AST SERPL W P-5'-P-CCNC: 12 U/L (ref 15–37)
BASOPHILS # BLD AUTO: 0 /CMM (ref 0–0.2)
BASOPHILS NFR BLD AUTO: 0.2 % (ref 0–2)
BILIRUB DIRECT SERPL-MCNC: 0.1 MG/DL (ref 0–0.2)
BILIRUB SERPL-MCNC: 0.2 MG/DL (ref 0.2–1)
BUN SERPL-MCNC: 99 MG/DL (ref 7–18)
CALCIUM SERPL-MCNC: 8.4 MG/DL (ref 8.5–10.1)
CHLORIDE SERPL-SCNC: 98 MMOL/L (ref 98–107)
CO2 SERPL-SCNC: 25 MMOL/L (ref 21–32)
CREAT SERPL-MCNC: 4.1 MG/DL (ref 0.6–1.3)
EOSINOPHIL NFR BLD AUTO: 1 % (ref 0–6)
GLUCOSE SERPL-MCNC: 201 MG/DL (ref 74–106)
HCT VFR BLD AUTO: 30 % (ref 33–45)
HGB BLD-MCNC: 10.2 G/DL (ref 11.5–14.8)
LYMPHOCYTES NFR BLD AUTO: 1.8 /CMM (ref 0.8–4.8)
LYMPHOCYTES NFR BLD AUTO: 16.9 % (ref 20–44)
MCHC RBC AUTO-ENTMCNC: 34 G/DL (ref 31–36)
MCV RBC AUTO: 90 FL (ref 82–100)
MONOCYTES NFR BLD AUTO: 0.5 /CMM (ref 0.1–1.3)
MONOCYTES NFR BLD AUTO: 4.4 % (ref 2–12)
NEUTROPHILS # BLD AUTO: 8 /CMM (ref 1.8–8.9)
NEUTROPHILS NFR BLD AUTO: 77.5 % (ref 43–81)
PH UR STRIP: 5 [PH] (ref 5–8)
PLATELET # BLD AUTO: 343 /CMM (ref 150–450)
POTASSIUM SERPL-SCNC: 4.8 MMOL/L (ref 3.5–5.1)
PROT SERPL-MCNC: 7.5 G/DL (ref 6.4–8.2)
PROT UR QL STRIP: 100 MG/DL
RBC # BLD AUTO: 3.34 MIL/UL (ref 4–5.2)
SODIUM SERPL-SCNC: 135 MMOL/L (ref 136–145)
UROBILINOGEN UR STRIP-MCNC: 0.2 EU/DL
WBC #/AREA URNS HPF: (no result) /HPF
WBC #/AREA URNS HPF: (no result) /HPF (ref 0–3)
WBC NRBC COR # BLD AUTO: 10.4 K/UL (ref 4.3–11)

## 2020-05-01 PROCEDURE — 99285 EMERGENCY DEPT VISIT HI MDM: CPT

## 2020-05-01 PROCEDURE — 93005 ELECTROCARDIOGRAM TRACING: CPT

## 2020-05-01 PROCEDURE — 96365 THER/PROPH/DIAG IV INF INIT: CPT

## 2020-05-01 PROCEDURE — 85025 COMPLETE CBC W/AUTO DIFF WBC: CPT

## 2020-05-01 PROCEDURE — 81001 URINALYSIS AUTO W/SCOPE: CPT

## 2020-05-01 PROCEDURE — 80076 HEPATIC FUNCTION PANEL: CPT

## 2020-05-01 PROCEDURE — 87040 BLOOD CULTURE FOR BACTERIA: CPT

## 2020-05-01 PROCEDURE — 83605 ASSAY OF LACTIC ACID: CPT

## 2020-05-01 PROCEDURE — 36415 COLL VENOUS BLD VENIPUNCTURE: CPT

## 2020-05-01 PROCEDURE — 80048 BASIC METABOLIC PNL TOTAL CA: CPT

## 2020-05-01 PROCEDURE — 87086 URINE CULTURE/COLONY COUNT: CPT

## 2020-05-01 PROCEDURE — 74176 CT ABD & PELVIS W/O CONTRAST: CPT

## 2020-05-01 PROCEDURE — 51702 INSERT TEMP BLADDER CATH: CPT

## 2020-05-01 PROCEDURE — 85730 THROMBOPLASTIN TIME PARTIAL: CPT

## 2020-05-01 NOTE — NUR
PT ACCEPTED TO [facility]. 220-B. # FOR REPORT [phone]. 
[facility] ETA 30-40 MINUTES.

## 2020-05-01 NOTE — NUR
PATIENT UNABLE TO URINATE SINCE LAST NIGHT. WAS AT URGENT CARE EARLIER. STARTED 
ON BACTRIM AND PYRIDIUM TODAY. TO ER BED 9, HOOKED TO MONITOR, CHANGED TO HOSP 
GOWN, WARM BLANKET PROVIDED, AWAITING MD DOWLING

## 2020-08-04 ENCOUNTER — HOSPITAL ENCOUNTER (EMERGENCY)
Dept: HOSPITAL 54 - ER | Age: 70
Discharge: HOME | End: 2020-08-04
Payer: COMMERCIAL

## 2020-08-04 VITALS — HEIGHT: 64 IN | BODY MASS INDEX: 25.44 KG/M2 | WEIGHT: 149 LBS

## 2020-08-04 VITALS — SYSTOLIC BLOOD PRESSURE: 135 MMHG | DIASTOLIC BLOOD PRESSURE: 58 MMHG

## 2020-08-04 DIAGNOSIS — Z79.82: ICD-10-CM

## 2020-08-04 DIAGNOSIS — Z79.899: ICD-10-CM

## 2020-08-04 DIAGNOSIS — I10: ICD-10-CM

## 2020-08-04 DIAGNOSIS — E11.9: ICD-10-CM

## 2020-08-04 DIAGNOSIS — Z79.4: ICD-10-CM

## 2020-08-04 DIAGNOSIS — Z79.84: ICD-10-CM

## 2020-08-04 DIAGNOSIS — Z98.890: ICD-10-CM

## 2020-08-04 DIAGNOSIS — R11.2: Primary | ICD-10-CM

## 2020-08-04 LAB
ALBUMIN SERPL BCP-MCNC: 4 G/DL (ref 3.4–5)
ALP SERPL-CCNC: 92 U/L (ref 46–116)
ALT SERPL W P-5'-P-CCNC: 20 U/L (ref 12–78)
AST SERPL W P-5'-P-CCNC: 17 U/L (ref 15–37)
BASOPHILS # BLD AUTO: 0 /CMM (ref 0–0.2)
BASOPHILS NFR BLD AUTO: 0.3 % (ref 0–2)
BILIRUB DIRECT SERPL-MCNC: 0.1 MG/DL (ref 0–0.2)
BILIRUB SERPL-MCNC: 0.3 MG/DL (ref 0.2–1)
BUN SERPL-MCNC: 43 MG/DL (ref 7–18)
CALCIUM SERPL-MCNC: 9.1 MG/DL (ref 8.5–10.1)
CHLORIDE SERPL-SCNC: 99 MMOL/L (ref 98–107)
CO2 SERPL-SCNC: 21 MMOL/L (ref 21–32)
CREAT SERPL-MCNC: 2.5 MG/DL (ref 0.6–1.3)
EOSINOPHIL NFR BLD AUTO: 0.3 % (ref 0–6)
GLUCOSE SERPL-MCNC: 342 MG/DL (ref 74–106)
HCT VFR BLD AUTO: 32 % (ref 33–45)
HGB BLD-MCNC: 10.4 G/DL (ref 11.5–14.8)
LIPASE SERPL-CCNC: 260 U/L (ref 73–393)
LYMPHOCYTES NFR BLD AUTO: 1.1 /CMM (ref 0.8–4.8)
LYMPHOCYTES NFR BLD AUTO: 15.2 % (ref 20–44)
MCHC RBC AUTO-ENTMCNC: 33 G/DL (ref 31–36)
MCV RBC AUTO: 88 FL (ref 82–100)
MONOCYTES NFR BLD AUTO: 0.3 /CMM (ref 0.1–1.3)
MONOCYTES NFR BLD AUTO: 4.2 % (ref 2–12)
NEUTROPHILS # BLD AUTO: 5.9 /CMM (ref 1.8–8.9)
NEUTROPHILS NFR BLD AUTO: 80 % (ref 43–81)
PH UR STRIP: 5.5 [PH] (ref 5–8)
PLATELET # BLD AUTO: 338 /CMM (ref 150–450)
POTASSIUM SERPL-SCNC: 5.1 MMOL/L (ref 3.5–5.1)
PROT SERPL-MCNC: 7.9 G/DL (ref 6.4–8.2)
RBC # BLD AUTO: 3.61 MIL/UL (ref 4–5.2)
RBC #/AREA URNS HPF: (no result) /HPF (ref 0–2)
SODIUM SERPL-SCNC: 134 MMOL/L (ref 136–145)
UROBILINOGEN UR STRIP-MCNC: 0.2 EU/DL
WBC #/AREA URNS HPF: (no result) /HPF
WBC NRBC COR # BLD AUTO: 7.4 K/UL (ref 4.3–11)

## 2020-08-04 PROCEDURE — 83690 ASSAY OF LIPASE: CPT

## 2020-08-04 PROCEDURE — 81001 URINALYSIS AUTO W/SCOPE: CPT

## 2020-08-04 PROCEDURE — 93005 ELECTROCARDIOGRAM TRACING: CPT

## 2020-08-04 PROCEDURE — 96361 HYDRATE IV INFUSION ADD-ON: CPT

## 2020-08-04 PROCEDURE — 87086 URINE CULTURE/COLONY COUNT: CPT

## 2020-08-04 PROCEDURE — 96376 TX/PRO/DX INJ SAME DRUG ADON: CPT

## 2020-08-04 PROCEDURE — 71045 X-RAY EXAM CHEST 1 VIEW: CPT

## 2020-08-04 PROCEDURE — 36415 COLL VENOUS BLD VENIPUNCTURE: CPT

## 2020-08-04 PROCEDURE — 80048 BASIC METABOLIC PNL TOTAL CA: CPT

## 2020-08-04 PROCEDURE — 85025 COMPLETE CBC W/AUTO DIFF WBC: CPT

## 2020-08-04 PROCEDURE — 80076 HEPATIC FUNCTION PANEL: CPT

## 2020-08-04 PROCEDURE — 99285 EMERGENCY DEPT VISIT HI MDM: CPT

## 2020-08-04 PROCEDURE — 96374 THER/PROPH/DIAG INJ IV PUSH: CPT

## 2020-08-04 PROCEDURE — 84484 ASSAY OF TROPONIN QUANT: CPT

## 2020-08-04 NOTE — NUR
PT BIB  FROM HOME,C/O NAUSEA/VOMITING X 2 DAYS. PT IS AAOX4, NOT IN 
RESPIRATORY DISTRESS, HOOKED TO CARDIAC MONITOR, KEPT RESTED AND COMFORTABLE. 
WILL CONTINUE TO MONITOR.

## 2020-10-05 ENCOUNTER — HOSPITAL ENCOUNTER (OUTPATIENT)
Dept: HOSPITAL 54 - MSC | Age: 70
Discharge: HOME | End: 2020-10-05
Attending: INTERNAL MEDICINE
Payer: COMMERCIAL

## 2020-10-05 DIAGNOSIS — R60.9: ICD-10-CM

## 2020-10-05 DIAGNOSIS — E11.40: ICD-10-CM

## 2020-10-05 DIAGNOSIS — N17.9: Primary | ICD-10-CM

## 2020-10-05 DIAGNOSIS — R80.9: ICD-10-CM

## 2020-10-05 DIAGNOSIS — F32.9: ICD-10-CM

## 2020-10-05 DIAGNOSIS — E11.311: ICD-10-CM

## 2020-10-05 DIAGNOSIS — E87.5: ICD-10-CM

## 2020-10-05 DIAGNOSIS — S76.919A: ICD-10-CM

## 2020-10-05 DIAGNOSIS — Z79.899: ICD-10-CM

## 2020-10-05 DIAGNOSIS — Z79.84: ICD-10-CM

## 2020-10-05 DIAGNOSIS — D64.9: ICD-10-CM

## 2020-10-05 DIAGNOSIS — I10: ICD-10-CM

## 2020-10-05 DIAGNOSIS — E03.9: ICD-10-CM

## 2020-11-04 ENCOUNTER — HOSPITAL ENCOUNTER (OUTPATIENT)
Dept: HOSPITAL 54 - MSC | Age: 70
Discharge: HOME | End: 2020-11-04
Attending: INTERNAL MEDICINE
Payer: COMMERCIAL

## 2020-11-04 DIAGNOSIS — E87.5: ICD-10-CM

## 2020-11-04 DIAGNOSIS — R80.9: ICD-10-CM

## 2020-11-04 DIAGNOSIS — Z79.899: ICD-10-CM

## 2020-11-04 DIAGNOSIS — R60.9: ICD-10-CM

## 2020-11-04 DIAGNOSIS — D64.9: ICD-10-CM

## 2020-11-04 DIAGNOSIS — I25.10: ICD-10-CM

## 2020-11-04 DIAGNOSIS — E03.9: ICD-10-CM

## 2020-11-04 DIAGNOSIS — Z79.4: ICD-10-CM

## 2020-11-04 DIAGNOSIS — I10: ICD-10-CM

## 2020-11-04 DIAGNOSIS — F32.9: ICD-10-CM

## 2020-11-04 DIAGNOSIS — N17.9: Primary | ICD-10-CM

## 2020-11-04 DIAGNOSIS — E11.40: ICD-10-CM

## 2020-11-04 DIAGNOSIS — H91.90: ICD-10-CM

## 2020-11-11 ENCOUNTER — HOSPITAL ENCOUNTER (EMERGENCY)
Dept: HOSPITAL 54 - ER | Age: 70
Discharge: ANOTHER HOSPITAL | End: 2020-11-11
Payer: COMMERCIAL

## 2020-11-11 VITALS — DIASTOLIC BLOOD PRESSURE: 73 MMHG | SYSTOLIC BLOOD PRESSURE: 140 MMHG

## 2020-11-11 VITALS — HEIGHT: 64 IN | WEIGHT: 152 LBS | BODY MASS INDEX: 25.95 KG/M2

## 2020-11-11 DIAGNOSIS — E11.51: ICD-10-CM

## 2020-11-11 DIAGNOSIS — E11.621: ICD-10-CM

## 2020-11-11 DIAGNOSIS — Z79.899: ICD-10-CM

## 2020-11-11 DIAGNOSIS — I45.2: ICD-10-CM

## 2020-11-11 DIAGNOSIS — L03.116: Primary | ICD-10-CM

## 2020-11-11 DIAGNOSIS — Z79.4: ICD-10-CM

## 2020-11-11 DIAGNOSIS — I10: ICD-10-CM

## 2020-11-11 DIAGNOSIS — Z20.828: ICD-10-CM

## 2020-11-11 DIAGNOSIS — L97.529: ICD-10-CM

## 2020-11-11 LAB
ALBUMIN SERPL BCP-MCNC: 3.6 G/DL (ref 3.4–5)
ALP SERPL-CCNC: 98 U/L (ref 46–116)
ALT SERPL W P-5'-P-CCNC: 22 U/L (ref 12–78)
AST SERPL W P-5'-P-CCNC: 17 U/L (ref 15–37)
BASOPHILS # BLD AUTO: 0.1 /CMM (ref 0–0.2)
BASOPHILS NFR BLD AUTO: 0.8 % (ref 0–2)
BILIRUB DIRECT SERPL-MCNC: 0.1 MG/DL (ref 0–0.2)
BILIRUB SERPL-MCNC: 0.3 MG/DL (ref 0.2–1)
BUN SERPL-MCNC: 24 MG/DL (ref 7–18)
CALCIUM SERPL-MCNC: 9.3 MG/DL (ref 8.5–10.1)
CHLORIDE SERPL-SCNC: 101 MMOL/L (ref 98–107)
CO2 SERPL-SCNC: 30 MMOL/L (ref 21–32)
CREAT SERPL-MCNC: 1.7 MG/DL (ref 0.6–1.3)
EOSINOPHIL NFR BLD AUTO: 0.8 % (ref 0–6)
GLUCOSE SERPL-MCNC: 175 MG/DL (ref 74–106)
HCT VFR BLD AUTO: 30 % (ref 33–45)
HGB BLD-MCNC: 9.6 G/DL (ref 11.5–14.8)
LYMPHOCYTES NFR BLD AUTO: 1.2 /CMM (ref 0.8–4.8)
LYMPHOCYTES NFR BLD AUTO: 10.5 % (ref 20–44)
MCHC RBC AUTO-ENTMCNC: 32 G/DL (ref 31–36)
MCV RBC AUTO: 86 FL (ref 82–100)
MONOCYTES NFR BLD AUTO: 0.5 /CMM (ref 0.1–1.3)
MONOCYTES NFR BLD AUTO: 4.3 % (ref 2–12)
NEUTROPHILS # BLD AUTO: 9.7 /CMM (ref 1.8–8.9)
NEUTROPHILS NFR BLD AUTO: 83.6 % (ref 43–81)
PLATELET # BLD AUTO: 397 /CMM (ref 150–450)
POTASSIUM SERPL-SCNC: 4 MMOL/L (ref 3.5–5.1)
PROT SERPL-MCNC: 8.4 G/DL (ref 6.4–8.2)
RBC # BLD AUTO: 3.5 MIL/UL (ref 4–5.2)
SODIUM SERPL-SCNC: 139 MMOL/L (ref 136–145)
WBC NRBC COR # BLD AUTO: 11.7 K/UL (ref 4.3–11)

## 2020-11-11 PROCEDURE — 93926 LOWER EXTREMITY STUDY: CPT

## 2020-11-11 PROCEDURE — 36415 COLL VENOUS BLD VENIPUNCTURE: CPT

## 2020-11-11 PROCEDURE — C9803 HOPD COVID-19 SPEC COLLECT: HCPCS

## 2020-11-11 PROCEDURE — 96375 TX/PRO/DX INJ NEW DRUG ADDON: CPT

## 2020-11-11 PROCEDURE — 93005 ELECTROCARDIOGRAM TRACING: CPT

## 2020-11-11 PROCEDURE — 87040 BLOOD CULTURE FOR BACTERIA: CPT

## 2020-11-11 PROCEDURE — 87426 SARSCOV CORONAVIRUS AG IA: CPT

## 2020-11-11 PROCEDURE — 99285 EMERGENCY DEPT VISIT HI MDM: CPT

## 2020-11-11 PROCEDURE — 71045 X-RAY EXAM CHEST 1 VIEW: CPT

## 2020-11-11 PROCEDURE — 96365 THER/PROPH/DIAG IV INF INIT: CPT

## 2020-11-11 PROCEDURE — 85025 COMPLETE CBC W/AUTO DIFF WBC: CPT

## 2020-11-11 PROCEDURE — 83605 ASSAY OF LACTIC ACID: CPT

## 2020-11-11 PROCEDURE — 80076 HEPATIC FUNCTION PANEL: CPT

## 2020-11-11 PROCEDURE — 87081 CULTURE SCREEN ONLY: CPT

## 2020-11-11 PROCEDURE — 80048 BASIC METABOLIC PNL TOTAL CA: CPT

## 2020-11-11 PROCEDURE — 84484 ASSAY OF TROPONIN QUANT: CPT

## 2020-11-11 PROCEDURE — 85730 THROMBOPLASTIN TIME PARTIAL: CPT

## 2020-11-11 PROCEDURE — 73630 X-RAY EXAM OF FOOT: CPT

## 2020-11-11 NOTE — NUR
BIBS FROM HOME TO ER BED 7. AAOX4. NO TIN RESP DISTRESS. AMBULATORY. CAME IN 
BECAUSE SHE WAS SENT BY HER PODIATRIST FOR HER L FOOT 1ST METATARSAL OPEN ULCER 
WHICH STRATED 1 MONTH AGO.  SHE HAS BEEN SEEING HER PODIATRIST FOR THE PAST 2 
WEEKS. SHE REPORTS THAT SHE STARTED KEFLEX YESTERDAY. WOUND IS 1.5CM X 1.5CM, 
NON DRAINING, REDNESS AND SWELLING PRESENT. SHE RATES HER PAIN 10/10. MD WAS AT 
THE BEDSIDE FOR EVAL. ORDERS RECEIVED, NOTED AND CARRIED OUT. IV LINE 
ESTABLISHED ON L AC 20G, BLOOD DRAWN AND GIVEN TO  AT BEDSIDE. EKG DONE 
AT BEDSIDE AS WELL

## 2020-11-11 NOTE — NUR
SPOKE TO FERNANDO SHAFER AT THE [facility]. REPORT WAS GIVEN 
BUT AT THE END OF THE REPORT WAS TOLD THAT SHE IS NOT THE ONE RECEIVING THE 
PATIENT BECAUSE PT IS GOING TO A DIFFERENT FLOOR.

## 2020-11-11 NOTE — NUR
RADHA CALLED AND CONNECTED DR. ALATORRE FROM [facility] TO DR. RINCON.

## 2020-11-11 NOTE — NUR
Call from Tasha OLIVARES Pt accepted to [facility], room [facility]. # for 
report [phone]

## 2020-11-20 ENCOUNTER — HOSPITAL ENCOUNTER (OUTPATIENT)
Dept: HOSPITAL 54 - MSC | Age: 70
Discharge: HOME | End: 2020-11-20
Attending: INTERNAL MEDICINE
Payer: COMMERCIAL

## 2020-11-20 DIAGNOSIS — E11.40: ICD-10-CM

## 2020-11-20 DIAGNOSIS — E11.311: ICD-10-CM

## 2020-11-20 DIAGNOSIS — R80.9: ICD-10-CM

## 2020-11-20 DIAGNOSIS — R60.9: ICD-10-CM

## 2020-11-20 DIAGNOSIS — D64.9: ICD-10-CM

## 2020-11-20 DIAGNOSIS — L03.032: ICD-10-CM

## 2020-11-20 DIAGNOSIS — F32.9: ICD-10-CM

## 2020-11-20 DIAGNOSIS — Z79.4: ICD-10-CM

## 2020-11-20 DIAGNOSIS — E87.5: ICD-10-CM

## 2020-11-20 DIAGNOSIS — Z79.899: ICD-10-CM

## 2020-11-20 DIAGNOSIS — E03.9: ICD-10-CM

## 2020-11-20 DIAGNOSIS — N17.9: Primary | ICD-10-CM

## 2020-11-20 DIAGNOSIS — E11.628: ICD-10-CM

## 2020-12-07 ENCOUNTER — HOSPITAL ENCOUNTER (EMERGENCY)
Dept: HOSPITAL 54 - ER | Age: 70
Discharge: HOME | End: 2020-12-07
Payer: COMMERCIAL

## 2020-12-07 ENCOUNTER — HOSPITAL ENCOUNTER (OUTPATIENT)
Dept: HOSPITAL 54 - MSC | Age: 70
Discharge: HOME | End: 2020-12-07
Attending: INTERNAL MEDICINE
Payer: COMMERCIAL

## 2020-12-07 VITALS
WEIGHT: 160 LBS | DIASTOLIC BLOOD PRESSURE: 67 MMHG | HEIGHT: 64 IN | SYSTOLIC BLOOD PRESSURE: 156 MMHG | BODY MASS INDEX: 27.31 KG/M2

## 2020-12-07 DIAGNOSIS — E03.9: ICD-10-CM

## 2020-12-07 DIAGNOSIS — D64.9: ICD-10-CM

## 2020-12-07 DIAGNOSIS — I25.10: ICD-10-CM

## 2020-12-07 DIAGNOSIS — Z98.890: ICD-10-CM

## 2020-12-07 DIAGNOSIS — I96: ICD-10-CM

## 2020-12-07 DIAGNOSIS — N17.9: ICD-10-CM

## 2020-12-07 DIAGNOSIS — Z79.899: ICD-10-CM

## 2020-12-07 DIAGNOSIS — R60.9: ICD-10-CM

## 2020-12-07 DIAGNOSIS — I10: Primary | ICD-10-CM

## 2020-12-07 DIAGNOSIS — I10: ICD-10-CM

## 2020-12-07 DIAGNOSIS — E78.5: ICD-10-CM

## 2020-12-07 DIAGNOSIS — Z79.82: ICD-10-CM

## 2020-12-07 DIAGNOSIS — L03.032: ICD-10-CM

## 2020-12-07 DIAGNOSIS — E11.52: Primary | ICD-10-CM

## 2020-12-07 DIAGNOSIS — E11.9: ICD-10-CM

## 2020-12-07 DIAGNOSIS — E11.40: ICD-10-CM

## 2020-12-07 DIAGNOSIS — Z79.4: ICD-10-CM

## 2020-12-07 DIAGNOSIS — F32.9: ICD-10-CM

## 2020-12-07 DIAGNOSIS — R80.9: ICD-10-CM

## 2020-12-07 DIAGNOSIS — E11.628: ICD-10-CM

## 2022-04-18 ENCOUNTER — HOSPITAL ENCOUNTER (EMERGENCY)
Dept: HOSPITAL 54 - ER | Age: 72
Discharge: HOME | End: 2022-04-18
Payer: COMMERCIAL

## 2022-04-18 VITALS — WEIGHT: 152 LBS | HEIGHT: 64 IN | BODY MASS INDEX: 25.95 KG/M2

## 2022-04-18 VITALS — SYSTOLIC BLOOD PRESSURE: 145 MMHG | DIASTOLIC BLOOD PRESSURE: 87 MMHG

## 2022-04-18 DIAGNOSIS — Z79.899: ICD-10-CM

## 2022-04-18 DIAGNOSIS — I10: ICD-10-CM

## 2022-04-18 DIAGNOSIS — E11.9: ICD-10-CM

## 2022-04-18 DIAGNOSIS — S40.021A: Primary | ICD-10-CM

## 2022-04-18 DIAGNOSIS — Z79.4: ICD-10-CM

## 2022-04-18 DIAGNOSIS — Z79.82: ICD-10-CM
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
## 2022-04-18 DIAGNOSIS — Z98.890: ICD-10-CM
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
## 2022-04-18 DIAGNOSIS — Z79.84: ICD-10-CM
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
## 2022-04-18 DIAGNOSIS — S80.11XA: ICD-10-CM
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
## 2022-09-27 ENCOUNTER — HOSPITAL ENCOUNTER (EMERGENCY)
Dept: HOSPITAL 54 - ER | Age: 72
Discharge: HOME | End: 2022-09-27
Payer: COMMERCIAL

## 2022-09-27 VITALS — HEIGHT: 64 IN | BODY MASS INDEX: 25.61 KG/M2 | WEIGHT: 150 LBS

## 2022-09-27 VITALS — DIASTOLIC BLOOD PRESSURE: 60 MMHG | SYSTOLIC BLOOD PRESSURE: 122 MMHG

## 2022-09-27 DIAGNOSIS — Z79.84: ICD-10-CM

## 2022-09-27 DIAGNOSIS — N18.9: ICD-10-CM

## 2022-09-27 DIAGNOSIS — Z79.899: ICD-10-CM

## 2022-09-27 DIAGNOSIS — I12.9: ICD-10-CM

## 2022-09-27 DIAGNOSIS — E11.22: ICD-10-CM

## 2022-09-27 DIAGNOSIS — E86.0: ICD-10-CM

## 2022-09-27 DIAGNOSIS — Z79.4: ICD-10-CM

## 2022-09-27 DIAGNOSIS — R51.9: Primary | ICD-10-CM

## 2022-09-27 DIAGNOSIS — Z98.890: ICD-10-CM

## 2022-09-27 LAB
ALBUMIN SERPL BCP-MCNC: 4.1 G/DL (ref 3.4–5)
ALP SERPL-CCNC: 107 U/L (ref 46–116)
ALT SERPL W P-5'-P-CCNC: 27 U/L (ref 12–78)
AST SERPL W P-5'-P-CCNC: 19 U/L (ref 15–37)
BASOPHILS # BLD AUTO: 0 K/UL (ref 0–0.2)
BASOPHILS NFR BLD AUTO: 0.4 % (ref 0–2)
BILIRUB DIRECT SERPL-MCNC: 0.1 MG/DL (ref 0–0.2)
BILIRUB SERPL-MCNC: 0.4 MG/DL (ref 0.2–1)
BILIRUB UR QL STRIP: NEGATIVE
BUN SERPL-MCNC: 36 MG/DL (ref 7–18)
CALCIUM SERPL-MCNC: 9.6 MG/DL (ref 8.5–10.1)
CHLORIDE SERPL-SCNC: 101 MMOL/L (ref 98–107)
CO2 SERPL-SCNC: 24 MMOL/L (ref 21–32)
COLOR UR: YELLOW
CREAT SERPL-MCNC: 2.3 MG/DL (ref 0.6–1.3)
EOSINOPHIL NFR BLD AUTO: 0.9 % (ref 0–6)
GLUCOSE SERPL-MCNC: 147 MG/DL (ref 74–106)
GLUCOSE UR STRIP-MCNC: NEGATIVE MG/DL
HCT VFR BLD AUTO: 38 % (ref 33–45)
HGB BLD-MCNC: 12.3 G/DL (ref 11.5–14.8)
LEUKOCYTE ESTERASE UR QL STRIP: NEGATIVE
LYMPHOCYTES NFR BLD AUTO: 2.1 K/UL (ref 0.8–4.8)
LYMPHOCYTES NFR BLD AUTO: 22 % (ref 20–44)
MCHC RBC AUTO-ENTMCNC: 32 G/DL (ref 31–36)
MCV RBC AUTO: 89 FL (ref 82–100)
MONOCYTES NFR BLD AUTO: 0.5 K/UL (ref 0.1–1.3)
MONOCYTES NFR BLD AUTO: 5.8 % (ref 2–12)
NEUTROPHILS # BLD AUTO: 6.8 K/UL (ref 1.8–8.9)
NEUTROPHILS NFR BLD AUTO: 70.9 % (ref 43–81)
NITRITE UR QL STRIP: NEGATIVE
PH UR STRIP: 6 [PH] (ref 5–8)
PLATELET # BLD AUTO: 298 K/UL (ref 150–450)
POTASSIUM SERPL-SCNC: 4.3 MMOL/L (ref 3.5–5.1)
PROT SERPL-MCNC: 8 G/DL (ref 6.4–8.2)
PROT UR QL STRIP: (no result) MG/DL
RBC # BLD AUTO: 4.27 MIL/UL (ref 4–5.2)
RBC #/AREA URNS HPF: (no result) /HPF (ref 0–2)
SODIUM SERPL-SCNC: 137 MMOL/L (ref 136–145)
UROBILINOGEN UR STRIP-MCNC: 0.2 EU/DL
WBC #/AREA URNS HPF: (no result) /HPF (ref 0–3)
WBC NRBC COR # BLD AUTO: 9.5 K/UL (ref 4.3–11)

## 2022-09-27 PROCEDURE — 99284 EMERGENCY DEPT VISIT MOD MDM: CPT

## 2022-09-27 PROCEDURE — 81001 URINALYSIS AUTO W/SCOPE: CPT

## 2022-09-27 PROCEDURE — 96361 HYDRATE IV INFUSION ADD-ON: CPT

## 2022-09-27 PROCEDURE — 96374 THER/PROPH/DIAG INJ IV PUSH: CPT

## 2022-09-27 PROCEDURE — 80048 BASIC METABOLIC PNL TOTAL CA: CPT

## 2022-09-27 PROCEDURE — 87040 BLOOD CULTURE FOR BACTERIA: CPT

## 2022-09-27 PROCEDURE — 36415 COLL VENOUS BLD VENIPUNCTURE: CPT

## 2022-09-27 PROCEDURE — 80076 HEPATIC FUNCTION PANEL: CPT

## 2022-09-27 PROCEDURE — 83605 ASSAY OF LACTIC ACID: CPT

## 2022-09-27 PROCEDURE — 85025 COMPLETE CBC W/AUTO DIFF WBC: CPT

## 2022-09-27 PROCEDURE — 84484 ASSAY OF TROPONIN QUANT: CPT

## 2022-09-27 PROCEDURE — 87086 URINE CULTURE/COLONY COUNT: CPT

## 2022-09-27 PROCEDURE — 96375 TX/PRO/DX INJ NEW DRUG ADDON: CPT

## 2022-09-27 PROCEDURE — 93005 ELECTROCARDIOGRAM TRACING: CPT

## 2022-09-27 NOTE — NUR
BIB FAMILY W/ CONCERN FOR BLOOD PRESSURE TAKEN AT HOME WAS LOW, ON MACROBID X 3 
DAYS FOR UTI. TO ER BED 9.